# Patient Record
Sex: MALE | Race: BLACK OR AFRICAN AMERICAN | Employment: OTHER | URBAN - METROPOLITAN AREA
[De-identification: names, ages, dates, MRNs, and addresses within clinical notes are randomized per-mention and may not be internally consistent; named-entity substitution may affect disease eponyms.]

---

## 2018-01-07 ENCOUNTER — HOSPITAL ENCOUNTER (INPATIENT)
Age: 73
LOS: 7 days | Discharge: HOME | DRG: 164 | End: 2018-01-14
Attending: INTERNAL MEDICINE | Admitting: INTERNAL MEDICINE
Payer: MEDICARE

## 2018-01-07 DIAGNOSIS — J90 RECURRENT LEFT PLEURAL EFFUSION: Primary | ICD-10-CM

## 2018-01-07 PROCEDURE — 65610000006 HC RM INTENSIVE CARE

## 2018-01-07 NOTE — IP AVS SNAPSHOT
1111 [facility] [address] [place] [address] 
[phone] Patient: Cheyenne Han MRN: [id number] TOY:9/25/[birth year]52 You are allergic to the following No active allergies Recent Documentation Height Weight BMI  
  
  
 1.73 m 68.2 kg 22.79 kg/m2 Unresulted Labs-Please follow up with your PCP about these lab tests Order Current Status SUSCEPTIBILITY, AER + ANAEROB Preliminary result Emergency Contacts  (Rel.) Home Phone Work Phone Mobile Phone Charlee Obando (Spouse) [phone] -- -- About your hospitalization You were admitted on:  January 7, 2018 You last received care in the:  [facility] You were discharged on:  January 14, 2018 Why you were hospitalized Your primary diagnosis was:  Recurrent Left Pleural Effusion Providers Seen During Your Hospitalization Provider Specialty Primary office phone Jayne Mir MD Internal Medicine [phone] Ken Silverman MD Internal Medicine [phone] Raphael Vogt MD Internal Medicine [phone] Your Primary Care Physician (PCP) Primary Care Physician Office Phone Office Fax NONE ** None ** ** None ** Follow-up Information Follow up With Details Comments Contact Info PCP In 4 days Samia Chatterjee MD In 2 weeks  [address] [place] [address], [address] [place] [address] [address] [place] [address] 
[phone] Gastroenterologist In 2 weeks Liliana Cooney MD In 1 week  [address] [place] [address] [address] [facility] [address] 
[phone] None   None (395) Patient stated that they have no PCP My Medications STOP taking these medications   
 aspirin delayed-release 81 mg tablet COUMADIN 5 mg tablet Generic drug:  warfarin  
   
  
 naproxen 500 mg tablet Commonly known as:  NAPROSYN  
   
  
  
TAKE these medications as instructed Instructions Each Dose to Equal  
 Morning Noon Evening Bedtime  
 amoxicillin-clavulanate 875-125 mg per tablet Commonly known as:  AUGMENTIN Your last dose was: Your next dose is: Take 1 Tab by mouth every twelve (12) hours. 1 Tab  
    
   
   
   
  
 carvedilol 25 mg tablet Commonly known as:  Daryle Rubins Your last dose was: Your next dose is: Take 25 mg by mouth two (2) times daily (with meals). 25 mg  
    
   
   
   
  
 digoxin 0.25 mg tablet Commonly known as:  Theramirez Pates Your last dose was: Your next dose is: Take 0.5 Tabs by mouth daily. 0.125 mg  
    
   
   
   
  
 L. acidoph & paracasei- S therm- Bifido 8 billion cell Cap cap Commonly known as:  SALLIE-Q/RISAQUAD Start taking on:  1/15/2018 Your last dose was: Your next dose is: Take 1 Cap by mouth daily. 1 Cap  
    
   
   
   
  
 oxyCODONE-acetaminophen 5-325 mg per tablet Commonly known as:  PERCOCET Your last dose was: Your next dose is: Take 2 Tabs by mouth every four (4) hours as needed. Max Daily Amount: 12 Tabs. 2 Tab  
    
   
   
   
  
 pantoprazole 40 mg tablet Commonly known as:  PROTONIX Your last dose was: Your next dose is: Take 1 Tab by mouth Before breakfast and dinner. 40 mg Where to Get Your Medications Information on where to get these meds will be given to you by the nurse or doctor. ! Ask your nurse or doctor about these medications  
  amoxicillin-clavulanate 875-125 mg per tablet  
 digoxin 0.25 mg tablet L. acidoph & paracasei- S therm- Bifido 8 billion cell Cap cap  
 oxyCODONE-acetaminophen 5-325 mg per tablet  
 pantoprazole 40 mg tablet Discharge Instructions Discharge Instructions PATIENT ID: Lorene Husain MRN: [id number] YOB: 1945 DATE OF ADMISSION: 1/7/2018 10:19 PM   
DATE OF DISCHARGE: 1/14/2018 PRIMARY CARE PROVIDER: None ATTENDING PHYSICIAN: Kristyn Taylor MD 
DISCHARGING PROVIDER: Kristyn Taylor MD   
To contact this individual call [phone] and ask the  to page. If unavailable ask to be transferred the Adult Hospitalist Department. DISCHARGE DIAGNOSES Pleural effusion CONSULTATIONS: IP CONSULT TO THORACIC SURGERY 
IP CONSULT TO GASTROENTEROLOGY PROCEDURES/SURGERIES: Procedure(s): LEFT VIDEO ASSISTED THOROCOSCOPY, MINI AXILLARY THOROCOTOMY, FULL PULMONARY DECORTICATION PENDING TEST RESULTS:  
At the time of discharge the following test results are still pending: none FOLLOW UP APPOINTMENTS:  
Follow-up Information Follow up With Details Comments Contact Info PCP In 4 days Veena Lucero MD In 2 weeks  [address] [place] [address] 
[phone] Gastroenterologist In 2 weeks Gabriel English MD In 1 week  200 [facility] [address] [place] [address] 
[phone] ADDITIONAL CARE RECOMMENDATIONS:  
Follow up with your PMD, thoracic surgery, Gastroenterologist as well as your cardiologist 
Your heart rate is still high, if goes higher than 130 please call your cardiologist 
Your gastroenterologist will let your know about restarting your ASA as well as coumadin Repeat CXR in 4 weeks DIET: Cardiac Diet ACTIVITY: avoid heavy lifting for 4 weeks and avoid driving until you return to thoracic surgery clinic. WOUND CARE: Leave CT bandage in place for 48 hours then safe to resume showering. DISCHARGE MEDICATIONS: 
 See Medication Reconciliation Form · It is important that you take the medication exactly as they are prescribed. · Keep your medication in the bottles provided by the pharmacist and keep a list of the medication names, dosages, and times to be taken in your wallet. · Do not take other medications without consulting your doctor. NOTIFY YOUR PHYSICIAN FOR ANY OF THE FOLLOWING:  
Fever over 101 degrees for 24 hours. Chest pain, shortness of breath, fever, chills, nausea, vomiting, diarrhea, change in mentation, falling, weakness, bleeding. Severe pain or pain not relieved by medications. Or, any other signs or symptoms that you may have questions about. DISPOSITION: 
x  Home With: 
 OT  PT  [facility]  RN  
  
 SNF/Inpatient Rehab/LTAC Independent/assisted living Hospice Other: CDMP Checked:  
Yes x PROBLEM LIST Updated: 
Yes x Signed:  
Brandon Newman MD 
1/14/2018 
1:57 PM 
 
Discharge Instructions Attachments/References HEART FAILURE: AVOIDING TRIGGERS (ENGLISH) Discharge Orders None [employer] Announcement We are excited to announce that we are making your provider's discharge notes available to you in [employer]. You will see these notes when they are completed and signed by the physician that discharged you from your recent hospital stay. If you have any questions or concerns about any information you see in [employer], please call the Health Information Department where you were seen or reach out to your Primary Care Provider for more information about your plan of care. Introducing [facility]! Mitch Romeo introduces [employer] patient portal. Now you can access parts of your medical record, email your doctor's office, and request medication refills online. 1. In your internet browser, go to [email or web address] 2. Click on the First Time User? Click Here link in the Sign In box. You will see the New Member Sign Up page. 3. Enter your [employer] Access Code exactly as it appears below. You will not need to use this code after youve completed the sign-up process. If you do not sign up before the expiration date, you must request a new code.  
 
· [facility] Access Code: [email or web address] 
 Expires: 4/7/2018 10:17 PM 
 
4. Enter the last four digits of your Social Security Number (xxxx) and Date of Birth (mm/dd/yyyy) as indicated and click Submit. You will be taken to the next sign-up page. 5. Create a [employer] ID. This will be your [employer] login ID and cannot be changed, so think of one that is secure and easy to remember. 6. Create a [employer] password. You can change your password at any time. 7. Enter your Password Reset Question and Answer. This can be used at a later time if you forget your password. 8. Enter your e-mail address. You will receive e-mail notification when new information is available in [address] 9. Click Sign Up. You can now view and download portions of your medical record. 10. Click the Download Summary menu link to download a portable copy of your medical information. If you have questions, please visit the Frequently Asked Questions section of the [employer] website. Remember, [employer] is NOT to be used for urgent needs. For medical emergencies, dial 911. Now available from your iPhone and Android! General Information Please provide this summary of care documentation to your next provider. Patient Signature:  ____________________________________________________________ Date:  ____________________________________________________________  
  
Marlyse Leaks Provider Signature:  ____________________________________________________________ Date:  ____________________________________________________________

## 2018-01-08 ENCOUNTER — APPOINTMENT (OUTPATIENT)
Dept: GENERAL RADIOLOGY | Age: 73
DRG: 164 | End: 2018-01-08
Attending: INTERNAL MEDICINE
Payer: MEDICARE

## 2018-01-08 ENCOUNTER — APPOINTMENT (OUTPATIENT)
Dept: GENERAL RADIOLOGY | Age: 73
DRG: 164 | End: 2018-01-08
Attending: NURSE PRACTITIONER
Payer: MEDICARE

## 2018-01-08 PROBLEM — J90 RECURRENT LEFT PLEURAL EFFUSION: Status: ACTIVE | Noted: 2018-01-08

## 2018-01-08 LAB
ALBUMIN SERPL-MCNC: 1.8 G/DL (ref 3.5–5)
ALBUMIN/GLOB SERPL: 0.3 {RATIO} (ref 1.1–2.2)
ALP SERPL-CCNC: 265 U/L (ref 45–117)
ALT SERPL-CCNC: 16 U/L (ref 12–78)
ANION GAP SERPL CALC-SCNC: 7 MMOL/L (ref 5–15)
AST SERPL-CCNC: 11 U/L (ref 15–37)
BASOPHILS # BLD: 0 K/UL (ref 0–0.1)
BASOPHILS NFR BLD: 0 % (ref 0–1)
BILIRUB SERPL-MCNC: 0.8 MG/DL (ref 0.2–1)
BODY FLD TYPE: NORMAL
BUN SERPL-MCNC: 15 MG/DL (ref 6–20)
BUN/CREAT SERPL: 15 (ref 12–20)
CALCIUM SERPL-MCNC: 8.8 MG/DL (ref 8.5–10.1)
CHLORIDE SERPL-SCNC: 108 MMOL/L (ref 97–108)
CK MB CFR SERPL CALC: ABNORMAL % (ref 0–2.5)
CK MB SERPL-MCNC: <1 NG/ML (ref 5–25)
CK SERPL-CCNC: 16 U/L (ref 39–308)
CO2 SERPL-SCNC: 23 MMOL/L (ref 21–32)
CREAT SERPL-MCNC: 1 MG/DL (ref 0.7–1.3)
DIFFERENTIAL METHOD BLD: ABNORMAL
EOSINOPHIL # BLD: 0.1 K/UL (ref 0–0.4)
EOSINOPHIL NFR BLD: 1 % (ref 0–7)
ERYTHROCYTE [DISTWIDTH] IN BLOOD BY AUTOMATED COUNT: 19.1 % (ref 11.5–14.5)
FERRITIN SERPL-MCNC: 767 NG/ML (ref 26–388)
FOLATE SERPL-MCNC: 26.6 NG/ML (ref 5–21)
GLOBULIN SER CALC-MCNC: 5.5 G/DL (ref 2–4)
GLUCOSE FLD-MCNC: <1 MG/DL
GLUCOSE SERPL-MCNC: 91 MG/DL (ref 65–100)
HCT VFR BLD AUTO: 22 % (ref 36.6–50.3)
HGB BLD-MCNC: 6.7 G/DL (ref 12.1–17)
INR PPP: 1.5 (ref 0.9–1.1)
IRON SATN MFR SERPL: 14 % (ref 20–50)
IRON SERPL-MCNC: 18 UG/DL (ref 35–150)
IRON SERPL-MCNC: 19 UG/DL (ref 35–150)
LACTATE SERPL-SCNC: 1.4 MMOL/L (ref 0.4–2)
LDH FLD L TO P-CCNC: >1200 U/L
LYMPHOCYTES # BLD: 1.7 K/UL (ref 0.8–3.5)
LYMPHOCYTES NFR BLD: 18 % (ref 12–49)
MAGNESIUM SERPL-MCNC: 1.9 MG/DL (ref 1.6–2.4)
MCH RBC QN AUTO: 25.7 PG (ref 26–34)
MCHC RBC AUTO-ENTMCNC: 30.5 G/DL (ref 30–36.5)
MCV RBC AUTO: 84.3 FL (ref 80–99)
MONOCYTES # BLD: 1.1 K/UL (ref 0–1)
MONOCYTES NFR BLD: 11 % (ref 5–13)
NEUTS SEG # BLD: 6.8 K/UL (ref 1.8–8)
NEUTS SEG NFR BLD: 70 % (ref 32–75)
PH FLD: 6.4 [PH]
PHOSPHATE SERPL-MCNC: 3.1 MG/DL (ref 2.6–4.7)
PLATELET # BLD AUTO: 496 K/UL (ref 150–400)
PLATELET COMMENTS,PCOM: ABNORMAL
POTASSIUM SERPL-SCNC: 4.2 MMOL/L (ref 3.5–5.1)
PROT SERPL-MCNC: 7.3 G/DL (ref 6.4–8.2)
PROTHROMBIN TIME: 14.9 SEC (ref 9–11.1)
RBC # BLD AUTO: 2.61 M/UL (ref 4.1–5.7)
RBC MORPH BLD: ABNORMAL
SODIUM SERPL-SCNC: 138 MMOL/L (ref 136–145)
SPECIMEN SOURCE FLD: NORMAL
SPECIMEN SOURCE FLD: NORMAL
TIBC SERPL-MCNC: 137 UG/DL (ref 250–450)
TROPONIN I SERPL-MCNC: <0.04 NG/ML
TSH SERPL DL<=0.05 MIU/L-ACNC: 0.69 UIU/ML (ref 0.36–3.74)
VIT B12 SERPL-MCNC: 805 PG/ML (ref 211–911)
WBC # BLD AUTO: 9.7 K/UL (ref 4.1–11.1)

## 2018-01-08 PROCEDURE — P9016 RBC LEUKOCYTES REDUCED: HCPCS | Performed by: INTERNAL MEDICINE

## 2018-01-08 PROCEDURE — 82607 VITAMIN B-12: CPT | Performed by: INTERNAL MEDICINE

## 2018-01-08 PROCEDURE — 87070 CULTURE OTHR SPECIMN AEROBIC: CPT | Performed by: THORACIC SURGERY (CARDIOTHORACIC VASCULAR SURGERY)

## 2018-01-08 PROCEDURE — 74011250636 HC RX REV CODE- 250/636: Performed by: INTERNAL MEDICINE

## 2018-01-08 PROCEDURE — 89050 BODY FLUID CELL COUNT: CPT | Performed by: THORACIC SURGERY (CARDIOTHORACIC VASCULAR SURGERY)

## 2018-01-08 PROCEDURE — 84484 ASSAY OF TROPONIN QUANT: CPT | Performed by: INTERNAL MEDICINE

## 2018-01-08 PROCEDURE — 88112 CYTOPATH CELL ENHANCE TECH: CPT | Performed by: INTERNAL MEDICINE

## 2018-01-08 PROCEDURE — 87186 SC STD MICRODIL/AGAR DIL: CPT | Performed by: HOSPITALIST

## 2018-01-08 PROCEDURE — 36415 COLL VENOUS BLD VENIPUNCTURE: CPT | Performed by: INTERNAL MEDICINE

## 2018-01-08 PROCEDURE — 82728 ASSAY OF FERRITIN: CPT | Performed by: INTERNAL MEDICINE

## 2018-01-08 PROCEDURE — 74011636637 HC RX REV CODE- 636/637: Performed by: INTERNAL MEDICINE

## 2018-01-08 PROCEDURE — 80053 COMPREHEN METABOLIC PANEL: CPT | Performed by: INTERNAL MEDICINE

## 2018-01-08 PROCEDURE — 74011000250 HC RX REV CODE- 250: Performed by: INTERNAL MEDICINE

## 2018-01-08 PROCEDURE — 74011250637 HC RX REV CODE- 250/637: Performed by: NURSE PRACTITIONER

## 2018-01-08 PROCEDURE — 77030032490 HC SLV COMPR SCD KNE COVD -B

## 2018-01-08 PROCEDURE — 94640 AIRWAY INHALATION TREATMENT: CPT

## 2018-01-08 PROCEDURE — 74011000258 HC RX REV CODE- 258: Performed by: INTERNAL MEDICINE

## 2018-01-08 PROCEDURE — 93306 TTE W/DOPPLER COMPLETE: CPT | Performed by: PHYSICIAN ASSISTANT

## 2018-01-08 PROCEDURE — 83615 LACTATE (LD) (LDH) ENZYME: CPT | Performed by: THORACIC SURGERY (CARDIOTHORACIC VASCULAR SURGERY)

## 2018-01-08 PROCEDURE — 83735 ASSAY OF MAGNESIUM: CPT | Performed by: INTERNAL MEDICINE

## 2018-01-08 PROCEDURE — 0W9B30Z DRAINAGE OF LEFT PLEURAL CAVITY WITH DRAINAGE DEVICE, PERCUTANEOUS APPROACH: ICD-10-PCS | Performed by: THORACIC SURGERY (CARDIOTHORACIC VASCULAR SURGERY)

## 2018-01-08 PROCEDURE — 74011250637 HC RX REV CODE- 250/637: Performed by: INTERNAL MEDICINE

## 2018-01-08 PROCEDURE — 86901 BLOOD TYPING SEROLOGIC RH(D): CPT | Performed by: INTERNAL MEDICINE

## 2018-01-08 PROCEDURE — 65610000006 HC RM INTENSIVE CARE

## 2018-01-08 PROCEDURE — C9113 INJ PANTOPRAZOLE SODIUM, VIA: HCPCS | Performed by: INTERNAL MEDICINE

## 2018-01-08 PROCEDURE — 85610 PROTHROMBIN TIME: CPT | Performed by: INTERNAL MEDICINE

## 2018-01-08 PROCEDURE — 83605 ASSAY OF LACTIC ACID: CPT | Performed by: INTERNAL MEDICINE

## 2018-01-08 PROCEDURE — 82550 ASSAY OF CK (CPK): CPT | Performed by: INTERNAL MEDICINE

## 2018-01-08 PROCEDURE — 77030029684 HC NEB SM VOL KT MONA -A

## 2018-01-08 PROCEDURE — 84443 ASSAY THYROID STIM HORMONE: CPT | Performed by: INTERNAL MEDICINE

## 2018-01-08 PROCEDURE — 83540 ASSAY OF IRON: CPT | Performed by: INTERNAL MEDICINE

## 2018-01-08 PROCEDURE — 88305 TISSUE EXAM BY PATHOLOGIST: CPT | Performed by: INTERNAL MEDICINE

## 2018-01-08 PROCEDURE — 84100 ASSAY OF PHOSPHORUS: CPT | Performed by: INTERNAL MEDICINE

## 2018-01-08 PROCEDURE — 87077 CULTURE AEROBIC IDENTIFY: CPT | Performed by: HOSPITALIST

## 2018-01-08 PROCEDURE — 83986 ASSAY PH BODY FLUID NOS: CPT | Performed by: THORACIC SURGERY (CARDIOTHORACIC VASCULAR SURGERY)

## 2018-01-08 PROCEDURE — 82746 ASSAY OF FOLIC ACID SERUM: CPT | Performed by: INTERNAL MEDICINE

## 2018-01-08 PROCEDURE — 36430 TRANSFUSION BLD/BLD COMPNT: CPT

## 2018-01-08 PROCEDURE — 74011250636 HC RX REV CODE- 250/636: Performed by: THORACIC SURGERY (CARDIOTHORACIC VASCULAR SURGERY)

## 2018-01-08 PROCEDURE — 30233N1 TRANSFUSION OF NONAUTOLOGOUS RED BLOOD CELLS INTO PERIPHERAL VEIN, PERCUTANEOUS APPROACH: ICD-10-PCS | Performed by: INTERNAL MEDICINE

## 2018-01-08 PROCEDURE — 82945 GLUCOSE OTHER FLUID: CPT | Performed by: THORACIC SURGERY (CARDIOTHORACIC VASCULAR SURGERY)

## 2018-01-08 PROCEDURE — 71045 X-RAY EXAM CHEST 1 VIEW: CPT

## 2018-01-08 PROCEDURE — 85025 COMPLETE CBC W/AUTO DIFF WBC: CPT | Performed by: INTERNAL MEDICINE

## 2018-01-08 PROCEDURE — 93306 TTE W/DOPPLER COMPLETE: CPT

## 2018-01-08 PROCEDURE — 86923 COMPATIBILITY TEST ELECTRIC: CPT | Performed by: INTERNAL MEDICINE

## 2018-01-08 RX ORDER — HYDROCODONE BITARTRATE AND ACETAMINOPHEN 5; 325 MG/1; MG/1
1 TABLET ORAL
Status: DISCONTINUED | OUTPATIENT
Start: 2018-01-08 | End: 2018-01-08

## 2018-01-08 RX ORDER — SODIUM CHLORIDE 0.9 % (FLUSH) 0.9 %
SYRINGE (ML) INJECTION
Status: COMPLETED
Start: 2018-01-08 | End: 2018-01-08

## 2018-01-08 RX ORDER — PANTOPRAZOLE SODIUM 40 MG/1
40 TABLET, DELAYED RELEASE ORAL
Status: DISCONTINUED | OUTPATIENT
Start: 2018-01-08 | End: 2018-01-14 | Stop reason: HOSPADM

## 2018-01-08 RX ORDER — LIDOCAINE HYDROCHLORIDE 10 MG/ML
20 INJECTION INFILTRATION; PERINEURAL
Status: DISCONTINUED | OUTPATIENT
Start: 2018-01-08 | End: 2018-01-09

## 2018-01-08 RX ORDER — MORPHINE SULFATE 2 MG/ML
2 INJECTION, SOLUTION INTRAMUSCULAR; INTRAVENOUS
Status: DISCONTINUED | OUTPATIENT
Start: 2018-01-08 | End: 2018-01-08

## 2018-01-08 RX ORDER — PREDNISONE 20 MG/1
40 TABLET ORAL
Status: DISCONTINUED | OUTPATIENT
Start: 2018-01-08 | End: 2018-01-08

## 2018-01-08 RX ORDER — ACETAMINOPHEN 325 MG/1
650 TABLET ORAL
Status: DISCONTINUED | OUTPATIENT
Start: 2018-01-08 | End: 2018-01-09

## 2018-01-08 RX ORDER — SODIUM CHLORIDE 9 MG/ML
250 INJECTION, SOLUTION INTRAVENOUS AS NEEDED
Status: DISCONTINUED | OUTPATIENT
Start: 2018-01-08 | End: 2018-01-09

## 2018-01-08 RX ORDER — LEVOFLOXACIN 5 MG/ML
750 INJECTION, SOLUTION INTRAVENOUS
Status: COMPLETED | OUTPATIENT
Start: 2018-01-08 | End: 2018-01-10

## 2018-01-08 RX ORDER — ONDANSETRON 2 MG/ML
4 INJECTION INTRAMUSCULAR; INTRAVENOUS
Status: DISCONTINUED | OUTPATIENT
Start: 2018-01-08 | End: 2018-01-14 | Stop reason: HOSPADM

## 2018-01-08 RX ORDER — IPRATROPIUM BROMIDE AND ALBUTEROL SULFATE 2.5; .5 MG/3ML; MG/3ML
3 SOLUTION RESPIRATORY (INHALATION)
Status: DISCONTINUED | OUTPATIENT
Start: 2018-01-08 | End: 2018-01-14 | Stop reason: HOSPADM

## 2018-01-08 RX ORDER — HYDROMORPHONE HYDROCHLORIDE 1 MG/ML
1 INJECTION, SOLUTION INTRAMUSCULAR; INTRAVENOUS; SUBCUTANEOUS
Status: COMPLETED | OUTPATIENT
Start: 2018-01-08 | End: 2018-01-08

## 2018-01-08 RX ORDER — VANCOMYCIN/0.9 % SOD CHLORIDE 1.5G/250ML
1500 PLASTIC BAG, INJECTION (ML) INTRAVENOUS ONCE
Status: COMPLETED | OUTPATIENT
Start: 2018-01-08 | End: 2018-01-10

## 2018-01-08 RX ORDER — DIGOXIN 125 MCG
125 TABLET ORAL DAILY
Status: DISCONTINUED | OUTPATIENT
Start: 2018-01-08 | End: 2018-01-11

## 2018-01-08 RX ORDER — CARVEDILOL 12.5 MG/1
25 TABLET ORAL 2 TIMES DAILY WITH MEALS
Status: DISCONTINUED | OUTPATIENT
Start: 2018-01-08 | End: 2018-01-14 | Stop reason: HOSPADM

## 2018-01-08 RX ORDER — FUROSEMIDE 10 MG/ML
40 INJECTION INTRAMUSCULAR; INTRAVENOUS DAILY
Status: DISCONTINUED | OUTPATIENT
Start: 2018-01-08 | End: 2018-01-08

## 2018-01-08 RX ORDER — DOCUSATE SODIUM 100 MG/1
100 CAPSULE, LIQUID FILLED ORAL 2 TIMES DAILY
Status: DISCONTINUED | OUTPATIENT
Start: 2018-01-08 | End: 2018-01-14 | Stop reason: HOSPADM

## 2018-01-08 RX ORDER — LEVOFLOXACIN 5 MG/ML
750 INJECTION, SOLUTION INTRAVENOUS EVERY 24 HOURS
Status: DISCONTINUED | OUTPATIENT
Start: 2018-01-08 | End: 2018-01-09

## 2018-01-08 RX ADMIN — IPRATROPIUM BROMIDE AND ALBUTEROL SULFATE 3 ML: .5; 3 SOLUTION RESPIRATORY (INHALATION) at 03:58

## 2018-01-08 RX ADMIN — SODIUM CHLORIDE 40 MG: 9 INJECTION INTRAMUSCULAR; INTRAVENOUS; SUBCUTANEOUS at 07:07

## 2018-01-08 RX ADMIN — PIPERACILLIN SODIUM AND TAZOBACTAM SODIUM 3.38 G: .375; 3 INJECTION, POWDER, LYOPHILIZED, FOR SOLUTION INTRAVENOUS at 17:10

## 2018-01-08 RX ADMIN — PREDNISONE 40 MG: 20 TABLET ORAL at 09:06

## 2018-01-08 RX ADMIN — Medication: at 16:00

## 2018-01-08 RX ADMIN — DOCUSATE SODIUM 100 MG: 100 CAPSULE, LIQUID FILLED ORAL at 09:06

## 2018-01-08 RX ADMIN — Medication 1 CAPSULE: at 09:06

## 2018-01-08 RX ADMIN — PANTOPRAZOLE SODIUM 40 MG: 40 TABLET, DELAYED RELEASE ORAL at 17:04

## 2018-01-08 RX ADMIN — CARVEDILOL 25 MG: 12.5 TABLET, FILM COATED ORAL at 17:04

## 2018-01-08 RX ADMIN — HYDROMORPHONE HYDROCHLORIDE 1 MG: 1 INJECTION, SOLUTION INTRAMUSCULAR; INTRAVENOUS; SUBCUTANEOUS at 15:22

## 2018-01-08 RX ADMIN — DILTIAZEM HYDROCHLORIDE 2.5 MG/HR: 5 INJECTION, SOLUTION INTRAVENOUS at 01:25

## 2018-01-08 RX ADMIN — VANCOMYCIN HYDROCHLORIDE 1500 MG: 10 INJECTION, POWDER, LYOPHILIZED, FOR SOLUTION INTRAVENOUS at 17:12

## 2018-01-08 RX ADMIN — DIGOXIN 0.12 MG: 125 TABLET ORAL at 09:06

## 2018-01-08 RX ADMIN — LEVOFLOXACIN 750 MG: 5 INJECTION, SOLUTION INTRAVENOUS at 01:32

## 2018-01-08 RX ADMIN — DOCUSATE SODIUM 100 MG: 100 CAPSULE, LIQUID FILLED ORAL at 17:48

## 2018-01-08 RX ADMIN — CARVEDILOL 25 MG: 12.5 TABLET, FILM COATED ORAL at 09:06

## 2018-01-08 RX ADMIN — PIPERACILLIN AND TAZOBACTAM 10.12 G: 3; .375 INJECTION, POWDER, FOR SOLUTION INTRAVENOUS at 17:49

## 2018-01-08 RX ADMIN — FUROSEMIDE 40 MG: 10 INJECTION, SOLUTION INTRAMUSCULAR; INTRAVENOUS at 10:01

## 2018-01-08 NOTE — PROGRESS NOTES
Chest tube performed. He did not have a pleural effusion as expected. We drained thick, purulent material from the chest.  New clinical diagnosis is empyema. I discussed this with the patient and the intensivist.  Given his Chest CT appearance and the purulent material Mr. Zeinab Diaz would benefit from a VATS decortication. I have posted him for tomorrow. He is at moderate risk due to the urgent nature, his unexplained anemia and his cardiac status. Also, I would have liked to know more about the ERUM mass. Intraoperatively we will biopsy the mass if feasible. We will NOT perform any definitive resections.

## 2018-01-08 NOTE — PROGRESS NOTES
Pharmacist Note - Vancomycin Dosing    Consult provided for this 67 y.o. male for indication of recurrent pleural effusion, empyema, possible PNA (?)  Antibiotic regimen(s): Cefepime + Levaquin    Recent Labs      18   0334   WBC  9.7   CREA  1.00   BUN  15     Frequency of BMP: x 1 tomorrow  Height: 173 cm  Weight: 64.8 kg  Est CrCl: ~60-65 ml/min  Temp (24hrs), Av.3 °F (36.8 °C), Min:97.8 °F (36.6 °C), Max:99.3 °F (37.4 °C)    Cultures: None    Goal trough = 15 - 20 mcg/mL    Therapy will be initiated with a loading dose of 1500 mg IV x 1 to be followed by a maintenance dose of 1000 mg IV every 16 hours. Pharmacy to follow patient daily and order levels / make dose adjustments as appropriate.

## 2018-01-08 NOTE — H&P
[address]  ACUTE CARE HISTORY AND PHYSICAL    Lorna Alansi  MR#: [id number]  : 1945  ACCOUNT #: [de-identified]   DATE OF SERVICE: 2018    PRIMARY CARE PHYSICIAN:  Unknown. SOURCE OF INFORMATION:  The patient. CHIEF COMPLAINT:  Shortness of breath. HISTORY OF PRESENT ILLNESS:  This is a 72-year-old man with a past medical history significant for COPD, atrial fibrillation, hypertension, congestive heart failure, pulmonary hypertension, and lung mass. He was in his usual state of health until about a week ago when patient developed shortness of breath. The shortness of breath is progressive and getting worse. The shortness of breath is with very minimal exertion. The patient stated that he cannot take a few steps without becoming short of breath. Patient has a history of a lung mass. About a year ago, the patient underwent evaluation for the lung mass including a biopsy that was not conclusive. He is scheduled for a repeat biopsy of the lung mass this coming February. The patient has also had a recurrent pleural effusion that has been treated in the past.  Because of the worsening shortness of breath, the patient went to [facility] for evaluation of his shortness of breath. The shortness of breath is not associated with fever, rigors or chills. When the patient arrived at the emergency room at this hospital, a chest x-ray was obtained. The chest x-ray shows a large, right, pleural effusion as well as a suspected pneumonia. Since the patient has had recurrent multiple episodes of pleural effusions, the emergency room physician discussed the patient with his pulmonologist who stated that the patient may benefit from a VATS procedure.   This procedure cannot be done at [facility].  The patient was then transferred to the hospitalist service at [facility] so that the thoracic surgeon can evaluate the patient for a VATS procedure. Also, the patient's pulmonologist will be able to see the patient to determine the next plan for the persistent, left, lung mass. The patient was also found to be in atrial fibrillation when the patient arrived at the emergency room. He received a Cardizem bolus for rate control. The patient was on Coumadin for anticoagulation for the atrial fibrillation. When he recently had a cataract extraction, Coumadin was held during the procedure. Since the patient is most likely going to have a lung biopsy done in February, he was told to stay off the Coumadin until after his repeat lung biopsy in February of this year. PAST MEDICAL HISTORY:  Lung mass, recurrent pleural effusion, atrial fibrillation, COPD, hypertension, chronic congestive heart failure, pulmonary hypertension, cardiomyopathy. ALLERGIES:  NO KNOWN DRUG ALLERGIES. MEDICATIONS:  Aspirin 81 mg daily, Naprosyn 500 mg twice daily, Coreg 25 mg twice daily, and Digoxin 250 mcg daily. FAMILY HISTORY:  This was reviewed; it is not pertinent to present illness. No family history of lung cancer or lung disease. PAST SURGICAL HISTORY:  This is significant for cataract extraction, bilateral inguinal hernia repair. SOCIAL HISTORY:  Patient is a former smoker. Patient quit alcohol abuse in 2014. REVIEW OF SYSTEMS:  HEENT:  No headache, no dizziness, no blurring of vision, no photophobia. RESPIRATORY:  This is positive for shortness of breath and cough. No hemoptysis. CARDIOVASCULAR:  Positive for orthopnea. No chest pain, no palpitations. GASTROINTESTINAL:  No nausea and vomiting, no diarrhea, no constipation. GENITOURINARY:  No dysuria, no urgency, no frequency. All other systems are reviewed and negative. PHYSICAL EXAMINATION:  GENERAL:  Patient appeared ill, in moderate distress.   VITAL SIGNS:  Temperature 98.7, pulse 141, blood pressure 128/84, respiratory rate 32, oxygen saturation 97% on room air. HEAD:  Normocephalic, atraumatic. EYES:  Normal eye movement. No redness, no drainage, no discharge. EARS:  Normal external ears with no obvious drainage. NOSE:  No deformity. No drainage. MOUTH AND THROAT:  No visible oral lesions. NECK:  Supple, mild JVD, no thyromegaly. CHEST:  Diffuse expiratory wheezing and bilateral basilar crackles. HEART:  Normal S1 and S2, irregularly irregular. No clinically appreciable murmur. ABDOMEN:  Soft, nontender, normal bowel sounds. CENTRAL NERVOUS SYSTEM:  Alert, oriented x3. No gross focal neurological deficit. EXTREMITIES:  No edema. Pulses 2+ bilaterally. MUSCULOSKELETAL:  No obvious joint deformity or swelling. SKIN:  No active skin lesions seen in the exposed part of the body. PSYCHIATRIC:  Normal mood and affect. LYMPHATIC SYSTEM:  No cervical lymphadenopathy. DIAGNOSTIC DATA:    ELECTROCARDIOGRAM:  The EKG shows atrial fibrillation with a rapid ventricular response and nonspecific ST-T wave abnormalities. CHEST X-RAY:  Chest x-ray shows a large, left, pleural effusion and a left lung mass, suspected pneumonia. CHEST CTA SCAN:  CTA of the chest done at the emergency room is not available for review at this time. LABORATORY DATA:  BNP level 430. Hematology:  WBC 11.4, hemoglobin 8.0, hematocrit 26.1, platelets 340. Lactic acid level 1.48. ABG done in the emergency room shows pH 7.465, pCO2 of 29.3, pO2 of 89, and oxygen saturation 97%. Chemistry:  Sodium 137, potassium 4.2, chloride 104, CO2 of 24, BUN 15, creatinine 1.0, glucose 96. Calcium 8.8. Total protein 7.7, albumin level 2.4, globulin 5.3, alkaline phosphatase 235, AST 21, ALT 18, total bilirubin 0.8, magnesium level 2.2. ASSESSMENT:  1. Recurrent left pleural effusion. 2.  Left lung mass. 3.  Persistent atrial fibrillation with rapid ventricular response. 4.  Anemia.   5.  Chronic obstructive pulmonary disease with acute exacerbation. 6.  Suspected bacterial pneumonia. 7.  Hypertension. 8.  Acute on chronic congestive heart failure. ASSESSMENT AND PLAN:  1. Recurrent left pleural effusion: We will admit the patient for further evaluation and treatment. We will await further recommendations from the thoracic surgeon. We will monitor the patient closely. 2.  Left lung mass:  Patient is awaiting a repeat lung biopsy. Will await further recommendations from the patient pulmonologist.  3.  Persistent atrial fibrillation with rapid ventricular response: Will start the patient on Cardizem, continuous infusion, for rate control, if tolerated by the patient's blood pressure. 4.  Anemia: This is secondary to chronic disease. Will carry out anemia workup including checking stool guaiac to rule out occult gastrointestinal bleed. 5.  Chronic obstructive pulmonary disease with acute exacerbation: We will start the patient on a short course of prednisone. Patient will also be placed on DuoNeb. We will await further recommendations from the pulmonologist.  6.  Suspected bacterial pneumonia: We will place the patient on Levaquin. 7.  Hypertension: We will resume preadmission medication. Monitor the patient's blood pressure closely. 8.  Acute on chronic congestive heart failure: We will start the patient on Lasix. We will obtain an echocardiogram to determine the ejection fraction and also the type of congestive heart failure. Cardiology consult will be requested to assist in further evaluation and treatment of congestive heart failure as well as evaluation and treatment of persistent atrial fibrillation with rapid ventricular response. OTHER ISSUES:   CODE STATUS:  The patient is a FULL CODE. PROPHYLAXIS:  We will request for SCD's for DVT prophylaxis.       MD CISCO Bower / EDMUNDO  D: 01/08/2018 00:18     T: 01/08/2018 03:52  JOB #: [id number]

## 2018-01-08 NOTE — PROCEDURES
Surgeon: Ravindra Rosario    Procedure: left Tube thoracostomy    Pre-op diagnosis: left recurrent pleural effusion    Post-op diagnosis: left empyema    Drains and tube: 28Fr chest tube    Specimens sent:  Pleural fluid was sent for cytology, Micro and chemistry analysis    EBL: 5ml    Anesth: 30ml 1% lidocaine, 1mg IV dilaudid    Procedure: Informed consent obtained. Timeout performed. Radiographs reveiwed. Left chest prepped and draped dk  Sterile fashion. 30ml 1% lido used as local anesthetic.  2cm incision made over 6th intercostal space, anterior axillary line. Access gained to thoracic cavity and 28Fr chest tube placed posteriorly and superiorly. Greater than 500ml Jesus Alberto purulence [thick, brown, malodorous] drained from the chest.  Specimens sent. Chest tube secured with 0-silk suture.     No immediate complications identified    CXR pending

## 2018-01-08 NOTE — PROGRESS NOTES
Intensivist    Transferred from [facility] for eval of pleural effusion and thoracic surgery eval  Has been seen in past by Dr Sonia Steiner for effusion and lung mass that reportedly was decreasing in size    Underwent thoracentesis on 12/27 - results not known at this time  Has a h/o afib but has been off of coumadin    Was anemic with hgb 6.7 and is receiving 2 units prbc's  Was started on a cardizem gtt by hospital service  Normally takes digoxin for afib    Trying to obtain images from outside facility    Being evaluated by thoracic surgery    --will stop cardizem  --being transfused  --thoracic eval ongoing    Does not appear to require an ICU level of support at this time    Stacey Reyna MD

## 2018-01-08 NOTE — ROUTINE PROCESS
Bedside, Verbal and Written shift change report given to Marley Haas RN (oncoming nurse) by Cassie Ramires RN  (offgoing nurse). Report included the following information SBAR, Kardex, ED Summary, Procedure Summary, Intake/Output, MAR, Accordion and Recent Results.

## 2018-01-08 NOTE — NURSE NAVIGATOR
Chart reviewed by Heart Failure Nurse Navigator. Heart Failure database completed. EF Pending%. ACEi/ARB: echo pending. BB: Coreg 25 mg. CRT not indicated. NYHA Functional Class requested via provider message on [employer] . Heart Failure Teach Back in Patient Education. Heart Failure Avoiding Triggers on Discharge Instructions.

## 2018-01-08 NOTE — CONSULTS
[address] NOTE  Leah Armstrongfernando, [facility] office  [phone] NP in-hospital cell phone M-F until 4:30  After 5pm or on weekends, please call  for physician on call        NAME:  Jose Enrique Alexander   :   1945   MRN:   [id number]       Referring Physician: Randall Estrada    Consult Date: 2018 8:38 AM    Chief Complaint: possible GI bleed     History of Present Illness:  Patient is a 67 y.o. who is seen in consultation at the request of Dr. Gurmeet Guerra for possible GI bleed. Medical history as listed below includes COPD, atrial fibrillation (on coumadin), heart failure, pulmonary hypertension, and a lung mass. Presented to the hospital for shortness of breath and is being treated for pneumonia and pleural effusions. Coumadin, naproxin, and aspirin have been on hold for the past week for cataract extraction and planned lung biopsy for February. He denies other NSAID use. He reports that 2-3 weeks ago he had a few episodes of melena which he reports resolved after thoracentesis. At that time he was also told that he had iron deficiency. He denies further melena or hematochezia. He has chronic shortness of breath. He reports some chest pain. He reports increased fatigue especially with activity. He denies palpitations or dizziness. He denies nausea, reflux, dysphagia, abdominal pain, constipation, or diarrhea. Hemoglobin 6.7 with iron deficiency, INR 1.5 on admission. He reports having a colonoscopy in 2016 which had polyps with Dr. Ayde Lynn in [address]     I have reviewed the emergency room note, hospital admission note, notes by all other clinicians who have seen the patient during this hospitalization to date. I have reviewed the problem list and the reason for this hospitalization.  I have reviewed the allergies and the medications the patient was taking at home prior to this hospitalization. PMH:  No past medical history on file. PSH:  No past surgical history on file. Allergies:  No Known Allergies    Home Medications:  Prior to Admission Medications   Prescriptions Last Dose Informant Patient Reported? Taking?   aspirin delayed-release 81 mg tablet 1/1/2018 at Unknown time  Yes Yes   Sig: Take 81 mg by mouth daily. carvedilol (COREG) 25 mg tablet 1/6/2018 at Unknown time  Yes Yes   Sig: Take 25 mg by mouth two (2) times daily (with meals). digoxin (DIGITEK) 0.25 mg tablet 1/6/2018 at Unknown time  Yes Yes   Sig: Take 250 mcg by mouth daily. naproxen (NAPROSYN) 500 mg tablet 1/1/2018 at Unknown time  Yes Yes   Sig: Take 500 mg by mouth two (2) times daily (with meals). warfarin (COUMADIN) 5 mg tablet 1/1/2018 at Unknown time  Yes Yes   Sig: Take 5 mg by mouth daily.       Facility-Administered Medications: None       Hospital Medications:  Current Facility-Administered Medications   Medication Dose Route Frequency    carvedilol (COREG) tablet 25 mg  25 mg Oral BID WITH MEALS    digoxin (LANOXIN) tablet 0.125 mg  125 mcg Oral DAILY    acetaminophen (TYLENOL) tablet 650 mg  650 mg Oral Q4H PRN    HYDROcodone-acetaminophen (NORCO) 5-325 mg per tablet 1 Tab  1 Tab Oral Q4H PRN    morphine injection 2 mg  2 mg IntraVENous Q4H PRN    ondansetron (ZOFRAN) injection 4 mg  4 mg IntraVENous Q4H PRN    docusate sodium (COLACE) capsule 100 mg  100 mg Oral BID    predniSONE (DELTASONE) tablet 40 mg  40 mg Oral DAILY WITH BREAKFAST    albuterol-ipratropium (DUO-NEB) 2.5 MG-0.5 MG/3 ML  3 mL Nebulization Q4H PRN    lactobac ac& pc-s.therm-b.anim (SALLIE Q/RISAQUAD)  1 Cap Oral DAILY    levoFLOXacin (LEVAQUIN) 750 mg in D5W IVPB  750 mg IntraVENous Q24H    furosemide (LASIX) injection 40 mg  40 mg IntraVENous DAILY    dilTIAZem (CARDIZEM) 125 mg in dextrose 5% 125 mL infusion  0-15 mg/hr IntraVENous TITRATE    0.9% sodium chloride infusion 250 mL  250 mL IntraVENous PRN Social History:  Social History   Substance Use Topics    Smoking status: Not on file    Smokeless tobacco: Not on file    Alcohol use Not on file       Family History:  No family history on file.     Review of Systems:  Constitutional: negative fever, negative chills, negative weight loss  Eyes:   +visual changes  ENT:   negative sore throat, tongue or lip swelling  Respiratory:  negative cough, +dyspnea  Cards:  +for chest pain, negative palpitations, lower extremity edema  GI:   See HPI  :  negative for frequency, dysuria  Integument:  negative for rash and pruritus  Heme:  negative for easy bruising and gum/nose bleeding  Musculoskeletal:negative for myalgias, +back pain   Neuro:  negative for headaches, dizziness, vertigo  Psych:  negative for feelings of anxiety, depression     Objective:   Patient Vitals for the past 8 hrs:   BP Temp Pulse Resp SpO2 Height   01/08/18 0817 100/80 97.8 °F (36.6 °C) - - 94 % -   01/08/18 0755 108/67 98 °F (36.7 °C) (!) 118 24 97 % -   01/08/18 0739 - 97.9 °F (36.6 °C) - - - -   01/08/18 0714 - - - - - 5' 8.11\" (1.73 m)   01/08/18 0700 117/83 - (!) 113 22 96 % -   01/08/18 0600 101/64 - (!) 115 (!) 32 96 % -   01/08/18 0500 99/64 - 99 25 94 % -   01/08/18 0400 107/74 98.7 °F (37.1 °C) (!) 121 (!) 31 99 % -   01/08/18 0300 106/67 - (!) 117 24 98 % -   01/08/18 0200 (!) 117/92 - (!) 144 (!) 33 96 % -   01/08/18 0100 104/84 - (!) 128 26 97 % -     01/08 0701 - 01/08 1900  In: -   Out: 100 [Urine:100]  01/06 1901 - 01/08 0700  In: 11.2 [I.V.:11.2]  Out: 150 [Urine:150]    EXAM:     CONST:  Pleasant male lying in bed, no acute distress   NEURO:  alert and oriented x 3   HEENT: EOMI, no scleral icterus   LUNGS: clear to ausculation, diminished bases (-) wheeze   CARD:  irregular rhythm, tachycardic, S1 S2   ABD:  soft, no tenderness, no rebound, bowel sounds (+) all 4 quadrants, no masses, non distended   EXT:  no edema, warm; bilateral upper extremity congenital abnormalities    PSYCH: full, not anxious     Data Review     Recent Labs      01/08/18   0334   WBC  9.7   HGB  6.7*   HCT  22.0*   PLT  496*     Recent Labs      01/08/18   0334   NA  138   K  4.2   CL  108   CO2  23   BUN  15   CREA  1.00   GLU  91   PHOS  3.1   CA  8.8     Recent Labs      01/08/18   0334   SGOT  11*   AP  265*   TP  7.3   ALB  1.8*   GLOB  5.5*     Recent Labs      01/08/18   0334   INR  1.5*   PTP  14.9*        Assessment:   · Anemia: iron deficiency; concern for GI blood loss     Patient Active Problem List   Diagnosis Code    Recurrent left pleural effusion J90     Plan:   · Plan for EGD with Dr. Darlene Cantu pending pulmonary/cardiac clearance  · Stool hemoccult pending  · Start BID PPI  · Trend CBC, transfuse as necessary  · Avoid non-essential NSAIDs  · Further plans per Dr. Darlene Cantu  · Thank you for allowing me to participate in care of John Ni     Signed By: Anne Gregory NP     1/8/2018  8:38 AM       GI attending note:    Gen: NAD; Cardiac: nml S1, S2; Pulm: Decreased breath sounds, chest tube in place; Abd: normoactive BS, nt, nd, no rebound/guarding. Vitals, labs, and imaging reviewed. Agree with the history, physical, and plan of the NP. Empyema-planned for procedure tomorrow by Thoracic Surgery. No signs of active bleeding at this time. EGD 2 weeks ago with no acute findings per pt. Will plan for EGD on Wednesday/Thursday after CT procedure based on clinical source. PPI. Thank you for this consultation.     Dr. Darlene Cantu

## 2018-01-08 NOTE — CONSULTS
Consult    Subjective:     Loc Nelson is a 67 y.o.  male who is being seen for recurrent pleural effusion and lung mass. Onset of symptoms was gradual last week with worsening SOB over the weekend. He presented to [facility] for evaluation yesterday. According to the patient he was found on CXR last summer to have a left lower lobe lung mass by his PCP. He then underwent a CT of his chest in August 2016. He was scheduled for a biopsy following a repeat CT scan  in November 2016, however the lesion had resolved. He had pleural effusion that was tapped once according to him(these records are not available to us)   Patient denies fevers or cough. Previous studies include CXR done yesterday at [facility], these images are not available to us, that showed a recurrent pleural effusion. Past Med Hx is positive for: COPD, Pleural Effusion, LLL lung lesion, Pulmonary hypertension, A-fib, HTN, CHF, GI Bleed    No past surgical history on file. No family history on file.    Social History   Substance Use Topics    Smoking status: Not on file    Smokeless tobacco: Not on file    Alcohol use Not on file       Current Facility-Administered Medications   Medication Dose Route Frequency    carvedilol (COREG) tablet 25 mg  25 mg Oral BID WITH MEALS    digoxin (LANOXIN) tablet 0.125 mg  125 mcg Oral DAILY    acetaminophen (TYLENOL) tablet 650 mg  650 mg Oral Q4H PRN    HYDROcodone-acetaminophen (NORCO) 5-325 mg per tablet 1 Tab  1 Tab Oral Q4H PRN    morphine injection 2 mg  2 mg IntraVENous Q4H PRN    ondansetron (ZOFRAN) injection 4 mg  4 mg IntraVENous Q4H PRN    docusate sodium (COLACE) capsule 100 mg  100 mg Oral BID    predniSONE (DELTASONE) tablet 40 mg  40 mg Oral DAILY WITH BREAKFAST    albuterol-ipratropium (DUO-NEB) 2.5 MG-0.5 MG/3 ML  3 mL Nebulization Q4H PRN    lactobac ac& pc-s.therm-b.anim (SALLIE Q/RISAQUAD)  1 Cap Oral DAILY    levoFLOXacin (LEVAQUIN) 750 mg in D5W IVPB  750 mg IntraVENous Q24H    furosemide (LASIX) injection 40 mg  40 mg IntraVENous DAILY    dilTIAZem (CARDIZEM) 125 mg in dextrose 5% 125 mL infusion  0-15 mg/hr IntraVENous TITRATE    0.9% sodium chloride infusion 250 mL  250 mL IntraVENous PRN    pantoprazole (PROTONIX) tablet 40 mg  40 mg Oral ACB&D      No Known Allergies     Review of Systems:  Respiratory: positive for SOB, recurrent effusion  Cardiovascular: positive for A-fib  Gastrointestinal: positive for H/O GI lower GI bleed  Hematologic/Lymphatic: positive for anemia with H&H of 6.7/22  Musculoskeletal:negative  Neurological: negative  Behavioral/Psychiatric: negative  Allergic/Immunologic: negative     Objective: Intake and Output:    01/08 0701 - 01/08 1900  In: 20.3 [I.V.:20.3]  Out: 100 [Urine:100]  01/06 1901 - 01/08 0700  In: 11.2 [I.V.:11.2]  Out: 150 [Urine:150]    Physical Exam:   Visit Vitals    /70 (BP 1 Location: Left arm, BP Patient Position: At rest)    Pulse (!) 112    Temp 98 °F (36.7 °C)    Resp 29    Ht 5' 8.11\" (1.73 m)    Wt 142 lb 13.7 oz (64.8 kg)    SpO2 98%    BMI 21.65 kg/m2     General appearance: alert, cooperative, no distress, appears stated age  Lungs: clear to auscultation bilaterally, diminished breath sounds L base  Chest wall: no tenderness  Heart: irregularly irregular rhythm  Abdomen: soft, non-tender. Bowel sounds normal. No masses,  no organomegaly  Extremities: extremities normal, atraumatic, no cyanosis or edema  Pulses: 2+ and symmetric  Skin: Skin color, texture, turgor normal. No rashes or lesions    ECG:  A-fib with RVR  Chest CT on November 11, 2016: The  left lower lobe lesion which was scheduled for biopsy has almost completely  resolved. The biopsy was then canceled.  Suggest further follow-up in 3 months  with CT.        Data Review:   Recent Results (from the past 24 hour(s))   CK W/ CKMB & INDEX    Collection Time: 01/08/18  1:39 AM   Result Value Ref Range    CK 16 (L) 39 - 308 U/L    CK - MB <1.0 <3.6 NG/ML    CK-MB Index Cannot be calculated 0 - 2.5     TROPONIN I    Collection Time: 01/08/18  1:39 AM   Result Value Ref Range    Troponin-I, Qt. <0.04 <6.89 ng/mL   METABOLIC PANEL, COMPREHENSIVE    Collection Time: 01/08/18  3:34 AM   Result Value Ref Range    Sodium 138 136 - 145 mmol/L    Potassium 4.2 3.5 - 5.1 mmol/L    Chloride 108 97 - 108 mmol/L    CO2 23 21 - 32 mmol/L    Anion gap 7 5 - 15 mmol/L    Glucose 91 65 - 100 mg/dL    BUN 15 6 - 20 MG/DL    Creatinine 1.00 0.70 - 1.30 MG/DL    BUN/Creatinine ratio 15 12 - 20      GFR est AA >60 >60 ml/min/1.73m2    GFR est non-AA >60 >60 ml/min/1.73m2    Calcium 8.8 8.5 - 10.1 MG/DL    Bilirubin, total 0.8 0.2 - 1.0 MG/DL    ALT (SGPT) 16 12 - 78 U/L    AST (SGOT) 11 (L) 15 - 37 U/L    Alk. phosphatase 265 (H) 45 - 117 U/L    Protein, total 7.3 6.4 - 8.2 g/dL    Albumin 1.8 (L) 3.5 - 5.0 g/dL    Globulin 5.5 (H) 2.0 - 4.0 g/dL    A-G Ratio 0.3 (L) 1.1 - 2.2     CBC WITH AUTOMATED DIFF    Collection Time: 01/08/18  3:34 AM   Result Value Ref Range    WBC 9.7 4.1 - 11.1 K/uL    RBC 2.61 (L) 4.10 - 5.70 M/uL    HGB 6.7 (L) 12.1 - 17.0 g/dL    HCT 22.0 (L) 36.6 - 50.3 %    MCV 84.3 80.0 - 99.0 FL    MCH 25.7 (L) 26.0 - 34.0 PG    MCHC 30.5 30.0 - 36.5 g/dL    RDW 19.1 (H) 11.5 - 14.5 %    PLATELET 415 (H) 461 - 400 K/uL    NEUTROPHILS 70 32 - 75 %    LYMPHOCYTES 18 12 - 49 %    MONOCYTES 11 5 - 13 %    EOSINOPHILS 1 0 - 7 %    BASOPHILS 0 0 - 1 %    ABS. NEUTROPHILS 6.8 1.8 - 8.0 K/UL    ABS. LYMPHOCYTES 1.7 0.8 - 3.5 K/UL    ABS. MONOCYTES 1.1 (H) 0.0 - 1.0 K/UL    ABS. EOSINOPHILS 0.1 0.0 - 0.4 K/UL    ABS.  BASOPHILS 0.0 0.0 - 0.1 K/UL    DF SMEAR SCANNED      PLATELET COMMENTS LARGE PLATELETS      RBC COMMENTS ANISOCYTOSIS  1+        RBC COMMENTS MICROCYTOSIS  1+        RBC COMMENTS HYPOCHROMIA  1+        RBC COMMENTS POLYCHROMASIA  1+       PHOSPHORUS    Collection Time: 01/08/18  3:34 AM   Result Value Ref Range    Phosphorus 3.1 2.6 - 4.7 MG/DL   MAGNESIUM    Collection Time: 01/08/18  3:34 AM   Result Value Ref Range    Magnesium 1.9 1.6 - 2.4 mg/dL   TSH 3RD GENERATION    Collection Time: 01/08/18  3:34 AM   Result Value Ref Range    TSH 0.69 0.36 - 3.74 uIU/mL   PROTHROMBIN TIME + INR    Collection Time: 01/08/18  3:34 AM   Result Value Ref Range    INR 1.5 (H) 0.9 - 1.1      Prothrombin time 14.9 (H) 9.0 - 11.1 sec   LACTIC ACID    Collection Time: 01/08/18  3:34 AM   Result Value Ref Range    Lactic acid 1.4 0.4 - 2.0 MMOL/L   FOLATE    Collection Time: 01/08/18  3:34 AM   Result Value Ref Range    Folate 26.6 (H) 5.0 - 21.0 ng/mL   VITAMIN B12    Collection Time: 01/08/18  3:34 AM   Result Value Ref Range    Vitamin B12 805 211 - 911 pg/mL   IRON    Collection Time: 01/08/18  3:34 AM   Result Value Ref Range    Iron 18 (L) 35 - 150 ug/dL   IRON PROFILE    Collection Time: 01/08/18  3:34 AM   Result Value Ref Range    Iron 19 (L) 35 - 150 ug/dL    TIBC 137 (L) 250 - 450 ug/dL    Iron % saturation 14 (L) 20 - 50 %   FERRITIN    Collection Time: 01/08/18  3:34 AM   Result Value Ref Range    Ferritin 767 (H) 26 - 388 NG/ML   TYPE + CROSSMATCH    Collection Time: 01/08/18  5:45 AM   Result Value Ref Range    Crossmatch Expiration 01/11/2018     ABO/Rh(D) O NEGATIVE     Antibody screen NEG     Unit number [id number]     Blood component type RC LR AS1     Unit division 00     Status of unit ISSUED     Crossmatch result Compatible     Unit number [id number]     Blood component type RC LR AS1     Unit division 00     Status of unit ALLOCATED     Crossmatch result Compatible        Chest x-ray done at [facility] is not available to us    Assessment:     Principal Problem:    Recurrent left pleural effusion (1/8/2018)        Plan:   As imaging is not available from [facility]:   Will repeat CXR, to evaluate possible effusion and or lung mass  If needed will obtain chest CT     Signed By: Rasta Murillo NP January 8, 2018       I personally saw and examined Mr. Munira Coleman on rounds. He  is pleasant 66 y/o gentleman admitted for a recurrent left pleural effusion, dyspnea and acute blood loss anemia. He had a Thoracentesis at [facility] in Dec 2017./ He is current receiving 2u PRBCs. On exam he does appear slightly uncomfortable and somewhat dyspneic. He is thin appearing. I personally reviewed his Chest CT from [facility]. He has a large left pleural effusion with a possible loculation. In addition there is collapse/compression of his LLL. It is unclear because of the effusion and compression if there is a ERUM mass. No lymphadenopathy noted. Diagnoses  1- Left pleural effusion  2- ERUM mass  3- Acute blood loss anemia    Plan to place a left chest tube and resend Cytology, culture and chemistry. Once pleural space is drained will request a repeat Chest CT to better characterize ERUM mass.   We will then decide based on all of the above re: possible VATS pleurodesis etc.

## 2018-01-08 NOTE — PROGRESS NOTES
TRANSFER - IN REPORT:    Verbal report received from Maribell Portillo RN (name) on Waldo  being received from [facility] (unit) for urgent transfer      Report consisted of patients Situation, Background, Assessment and   Recommendations(SBAR). Information from the following report(s) SBAR and Cardiac Rhythm AFib was reviewed with the receiving nurse. Opportunity for questions and clarification was provided. Assessment completed upon patients arrival to unit and care assumed. 2220: Patient arrival. Patient does not have a PE (pulmonary embolism) as given in report but rather pleural effusions. Patient stated he was to see a physician on the 16th of this month for a f/u of mass on L upper lobe. Patient is A&Ox4, denies SOB or discomfort and sats adequate on RA. Patient in AFib and ST. Per patient he is not on coumadin or any other therapy for AFib due to his cataract surgery a few months prior. Patient further stated he is to resume coumadin in February. Dr. Malcom Monteiro paged at this time. Immediate call back from Dr. Emiliano Randolph. Dr. Emiliano Randolph requested I page Dr. Malcom Monteiro again. 2230: Obtained Dr. Eh Castro extension from ED. Dr. Malcom Monteiro stated he would have Dr. Scarlett Gutierrez come see patient. 2306: Paged Dr. Scarlett Gutierrez.  2308: Spoke with Dr. Scarlett Gutierrez briefly. Code in CCU Dr. Scarlett Gutierrez to call back. 2315: Per Dr. Scarlett Gutierrez he will send Dr. Emiliano Randolph up as he is still in code. 2330: Dr. Emiliano Randolph up to assess patient. I provided Dr. Emiliano Randolph with all documentation given to me by transport. Primary Nurse Carlton Rosario and Lenore Augustin RN performed a dual skin assessment on this patient No impairment noted  Gustavo score is 19. Problem: Falls - Risk of  Goal: *Absence of Falls  Document Beau Fall Risk and appropriate interventions in the flowsheet.    Outcome: Progressing Towards Goal  Fall Risk Interventions:  Mobility Interventions: Patient to call before getting OOB              Elimination Interventions: Patient to call for help with toileting needs, Urinal in reach, Call light in reach

## 2018-01-08 NOTE — PROGRESS NOTES
Hospitalist Progress Note  Elvin Maynard MD  Answering service: [phone] from in house phone  Cell:       Date of Service:  2018  NAME:  Carolyn Wilson  :  1945  MRN:  [id number]      Admission Summary:   68 yo male with PMHx of COPD, Hx of Left pleural effusion, Left lung mass, Atrial Fib, HTN, Hx of GI bleed s/p EGD 2017, taken off Asa/coumadin and Naproxen at that time. Pt states he had dark BMs before Luis Alfredo but they have resolved and have not recurred. Pt now admitted for worsening SOB over past 1 week that is worse with exertion. Pt denies any chest pain, fever, chills, cough, n/v, diarrhea, abdominal pain. Pt went to [facility] who transferred patient to [facility] for possible VATS. Interval history / Subjective:     Pt seen and examined  Doing well   No chest pain, sob, nv/d.       Assessment & Plan:     Recurrent Large Left Pleural effusion, r/o malignancy  - hx of COPD , Ex-smoker  - reviewed CTA chest with Thoracic surgery  - possible plan for Chest tube this afternoon, will send routine labs including cytology  - Re-image once lung expands then consider VATS    Hx of Lung Mass  - not seen clearly on CTA chest   - will need repeat CT at some point prior to VATs  - Thoracic surgery following    Atrial Fib with RVR  - improved with Cardizem gtt  - now on Coreg and Dig  - not on Coumadin/ASA since 2017 due to GI Bleed   - Plan to start Eliquis next month    Anemia, appears Chronic  - hb 8.2 at [facility]  - unclear if was lab error  - 2U PRBCs being transfused  - recent GI work up included EGD which did not show active bleeding per patient  - patient is to have repeat EGD and Colon at some point per patient  - plan for EGD in AM   - PPI BID     COPD, chronic  - does not appear to have exacerbation  - d/c steroids  - Nebs PRN     Compressive Atelectasis  - no obvious symptoms of Pneumonia at this time  - empirically on Levaquin, will continue for time being    Code status: FULL  DVT prophylaxis: SCDs    Care Plan discussed with: Patient/Family, Nurse and Consultant Thoracic surgery  Disposition: Home w/Family     Hospital Problems  Date Reviewed: 1/8/2018          Codes Class Noted POA    * (Principal)Recurrent left pleural effusion ICD-10-CM: J90  ICD-9-CM: 511.9  1/8/2018 Yes                Review of Systems:   A comprehensive review of systems was negative except for that written in the HPI. Vital Signs:    Last 24hrs VS reviewed since prior progress note. Most recent are:  Visit Vitals    /82 (BP 1 Location: Left arm, BP Patient Position: At rest)    Pulse (!) 111    Temp 98.2 °F (36.8 °C)    Resp 28    Ht 5' 8.11\" (1.73 m)    Wt 64.8 kg (142 lb 13.7 oz)    SpO2 99%    BMI 21.65 kg/m2         Intake/Output Summary (Last 24 hours) at 01/08/18 1143  Last data filed at 01/08/18 1100   Gross per 24 hour   Intake           348.24 ml   Output              250 ml   Net            98.24 ml        Physical Examination:             Constitutional:  No acute distress, cooperative, pleasant    ENT:  Oral mucous moist,    Resp:  clear on Rt, diminished on left   CV:  Irregular     GI:  Soft, non distended, non tender. normoactive bowel sounds    Musculoskeletal:  No edema, warm, 2+ pulses throughout    Neurologic:  Moves all extremities.   AAOx3, RUE contracture-chronic      Skin:  Good turgor, no rashes or ulcers       Data Review:    Review and/or order of clinical lab test  Review and/or order of tests in the radiology section of CPT  Review and/or order of tests in the medicine section of CPT      Labs:     Recent Labs      01/08/18   0334   WBC  9.7   HGB  6.7*   HCT  22.0*   PLT  496*     Recent Labs      01/08/18   0334   NA  138   K  4.2   CL  108   CO2  23   BUN  15   CREA  1.00   GLU  91   CA  8.8   MG  1.9   PHOS  3.1     Recent Labs      01/08/18   0334   SGOT  11* ALT  16   AP  265*   TBILI  0.8   TP  7.3   ALB  1.8*   GLOB  5.5*     Recent Labs      01/08/18   0334   INR  1.5*   PTP  14.9*      Recent Labs      01/08/18   0334   TIBC  137*   PSAT  14*   FERR  767*      Lab Results   Component Value Date/Time    Folate 26.6 01/08/2018 03:34 AM      No results for input(s): PH, PCO2, PO2 in the last 72 hours.   Recent Labs      01/08/18   0139   CPK  16*   CKNDX  Cannot be calculated   TROIQ  <0.04     No results found for: CHOL, CHOLX, CHLST, CHOLV, HDL, LDL, LDLC, DLDLP, TGLX, TRIGL, TRIGP, CHHD, CHHDX  No results found for: GLUCPOC  No results found for: COLOR, APPRN, SPGRU, REFSG, JEANNINE, PROTU, GLUCU, KETU, BILU, UROU, ANKUR, LEUKU, GLUKE, EPSU, BACTU, WBCU, RBCU, CASTS, UCRY      Medications Reviewed:     Current Facility-Administered Medications   Medication Dose Route Frequency    carvedilol (COREG) tablet 25 mg  25 mg Oral BID WITH MEALS    digoxin (LANOXIN) tablet 0.125 mg  125 mcg Oral DAILY    acetaminophen (TYLENOL) tablet 650 mg  650 mg Oral Q4H PRN    HYDROcodone-acetaminophen (NORCO) 5-325 mg per tablet 1 Tab  1 Tab Oral Q4H PRN    morphine injection 2 mg  2 mg IntraVENous Q4H PRN    ondansetron (ZOFRAN) injection 4 mg  4 mg IntraVENous Q4H PRN    docusate sodium (COLACE) capsule 100 mg  100 mg Oral BID    predniSONE (DELTASONE) tablet 40 mg  40 mg Oral DAILY WITH BREAKFAST    albuterol-ipratropium (DUO-NEB) 2.5 MG-0.5 MG/3 ML  3 mL Nebulization Q4H PRN    lactobac ac& pc-s.therm-b.anim (SALLIE Q/RISAQUAD)  1 Cap Oral DAILY    levoFLOXacin (LEVAQUIN) 750 mg in D5W IVPB  750 mg IntraVENous Q24H    furosemide (LASIX) injection 40 mg  40 mg IntraVENous DAILY    0.9% sodium chloride infusion 250 mL  250 mL IntraVENous PRN    pantoprazole (PROTONIX) tablet 40 mg  40 mg Oral ACB&D     ______________________________________________________________________  EXPECTED LENGTH OF STAY: - - -  ACTUAL LENGTH OF STAY:          1                 Santiago Pressley, MD

## 2018-01-09 ENCOUNTER — ANESTHESIA EVENT (OUTPATIENT)
Dept: SURGERY | Age: 73
DRG: 164 | End: 2018-01-09
Payer: MEDICARE

## 2018-01-09 ENCOUNTER — APPOINTMENT (OUTPATIENT)
Dept: GENERAL RADIOLOGY | Age: 73
DRG: 164 | End: 2018-01-09
Attending: THORACIC SURGERY (CARDIOTHORACIC VASCULAR SURGERY)
Payer: MEDICARE

## 2018-01-09 ENCOUNTER — ANESTHESIA (OUTPATIENT)
Dept: SURGERY | Age: 73
DRG: 164 | End: 2018-01-09
Payer: MEDICARE

## 2018-01-09 LAB
ANION GAP SERPL CALC-SCNC: 13 MMOL/L (ref 5–15)
APPEARANCE FLD: ABNORMAL
BASOPHILS # BLD: 0 K/UL (ref 0–0.1)
BASOPHILS NFR BLD: 0 % (ref 0–1)
BUN SERPL-MCNC: 20 MG/DL (ref 6–20)
BUN/CREAT SERPL: 15 (ref 12–20)
CALCIUM SERPL-MCNC: 7.1 MG/DL (ref 8.5–10.1)
CHLORIDE SERPL-SCNC: 106 MMOL/L (ref 97–108)
CO2 SERPL-SCNC: 19 MMOL/L (ref 21–32)
COLOR FLD: ABNORMAL
CREAT SERPL-MCNC: 1.37 MG/DL (ref 0.7–1.3)
EOSINOPHIL # BLD: 0 K/UL (ref 0–0.4)
EOSINOPHIL NFR BLD: 0 % (ref 0–7)
ERYTHROCYTE [DISTWIDTH] IN BLOOD BY AUTOMATED COUNT: 18.3 % (ref 11.5–14.5)
GLUCOSE BLD STRIP.AUTO-MCNC: 104 MG/DL (ref 65–100)
GLUCOSE SERPL-MCNC: 298 MG/DL (ref 65–100)
HCT VFR BLD AUTO: 25.4 % (ref 36.6–50.3)
HGB BLD-MCNC: 8.2 G/DL (ref 12.1–17)
HGB BLD-MCNC: 9.3 G/DL (ref 12.1–17)
HGB BLD-MCNC: 9.3 G/DL (ref 12.1–17)
HGB BLD-MCNC: 9.7 G/DL (ref 12.1–17)
LYMPHOCYTES # BLD: 0.8 K/UL (ref 0.8–3.5)
LYMPHOCYTES NFR BLD: 8 % (ref 12–49)
MCH RBC QN AUTO: 26.8 PG (ref 26–34)
MCHC RBC AUTO-ENTMCNC: 32.3 G/DL (ref 30–36.5)
MCV RBC AUTO: 83 FL (ref 80–99)
MONOCYTES # BLD: 0.8 K/UL (ref 0–1)
MONOCYTES NFR BLD: 8 % (ref 5–13)
NEUTROPHILS NFR FLD: 0 %
NEUTS SEG # BLD: 8.2 K/UL (ref 1.8–8)
NEUTS SEG NFR BLD: 84 % (ref 32–75)
NRBC # BLD: 0.02 K/UL (ref 0–0.01)
NRBC BLD-RTO: 0.2 PER 100 WBC
NUC CELL # FLD: ABNORMAL /CU MM (ref 0–5)
PATH REV BLD -IMP: ABNORMAL
PLATELET # BLD AUTO: 462 K/UL (ref 150–400)
PLATELET COMMENTS,PCOM: ABNORMAL
POTASSIUM SERPL-SCNC: 3.6 MMOL/L (ref 3.5–5.1)
RBC # BLD AUTO: 3.06 M/UL (ref 4.1–5.7)
RBC # FLD: >100 /CU MM
RBC MORPH BLD: ABNORMAL
SERVICE CMNT-IMP: ABNORMAL
SODIUM SERPL-SCNC: 138 MMOL/L (ref 136–145)
SPECIMEN SOURCE FLD: ABNORMAL
TOTAL CELLS COUNTED SPEC: 0
WBC # BLD AUTO: 9.8 K/UL (ref 4.1–11.1)
WBC NRBC COR # BLD: ABNORMAL 10*3/UL

## 2018-01-09 PROCEDURE — 77030010507 HC ADH SKN DERMBND J&J -B: Performed by: THORACIC SURGERY (CARDIOTHORACIC VASCULAR SURGERY)

## 2018-01-09 PROCEDURE — 77030011640 HC PAD GRND REM COVD -A: Performed by: THORACIC SURGERY (CARDIOTHORACIC VASCULAR SURGERY)

## 2018-01-09 PROCEDURE — 0BCJ0ZZ EXTIRPATION OF MATTER FROM LEFT LOWER LUNG LOBE, OPEN APPROACH: ICD-10-PCS | Performed by: THORACIC SURGERY (CARDIOTHORACIC VASCULAR SURGERY)

## 2018-01-09 PROCEDURE — 77030032490 HC SLV COMPR SCD KNE COVD -B: Performed by: THORACIC SURGERY (CARDIOTHORACIC VASCULAR SURGERY)

## 2018-01-09 PROCEDURE — 77030020747 HC TU INSUF ENDOSC TELE -A: Performed by: THORACIC SURGERY (CARDIOTHORACIC VASCULAR SURGERY)

## 2018-01-09 PROCEDURE — 77030031139 HC SUT VCRL2 J&J -A: Performed by: THORACIC SURGERY (CARDIOTHORACIC VASCULAR SURGERY)

## 2018-01-09 PROCEDURE — 74011000250 HC RX REV CODE- 250: Performed by: THORACIC SURGERY (CARDIOTHORACIC VASCULAR SURGERY)

## 2018-01-09 PROCEDURE — 77030005401 HC CATH RAD ARRO -A: Performed by: ANESTHESIOLOGY

## 2018-01-09 PROCEDURE — 77030012390 HC DRN CHST BTL GTNG -B: Performed by: THORACIC SURGERY (CARDIOTHORACIC VASCULAR SURGERY)

## 2018-01-09 PROCEDURE — 77030018836 HC SOL IRR NACL ICUM -A: Performed by: THORACIC SURGERY (CARDIOTHORACIC VASCULAR SURGERY)

## 2018-01-09 PROCEDURE — 0W9B30Z DRAINAGE OF LEFT PLEURAL CAVITY WITH DRAINAGE DEVICE, PERCUTANEOUS APPROACH: ICD-10-PCS | Performed by: THORACIC SURGERY (CARDIOTHORACIC VASCULAR SURGERY)

## 2018-01-09 PROCEDURE — 77030002996 HC SUT SLK J&J -A: Performed by: THORACIC SURGERY (CARDIOTHORACIC VASCULAR SURGERY)

## 2018-01-09 PROCEDURE — 82962 GLUCOSE BLOOD TEST: CPT

## 2018-01-09 PROCEDURE — 77030020782 HC GWN BAIR PAWS FLX 3M -B

## 2018-01-09 PROCEDURE — 77030018846 HC SOL IRR STRL H20 ICUM -A: Performed by: THORACIC SURGERY (CARDIOTHORACIC VASCULAR SURGERY)

## 2018-01-09 PROCEDURE — 77030003666 HC NDL SPINAL BD -A: Performed by: THORACIC SURGERY (CARDIOTHORACIC VASCULAR SURGERY)

## 2018-01-09 PROCEDURE — 74011250637 HC RX REV CODE- 250/637: Performed by: INTERNAL MEDICINE

## 2018-01-09 PROCEDURE — P9045 ALBUMIN (HUMAN), 5%, 250 ML: HCPCS

## 2018-01-09 PROCEDURE — 74011000250 HC RX REV CODE- 250: Performed by: HOSPITALIST

## 2018-01-09 PROCEDURE — 85025 COMPLETE CBC W/AUTO DIFF WBC: CPT | Performed by: HOSPITALIST

## 2018-01-09 PROCEDURE — 74011250637 HC RX REV CODE- 250/637: Performed by: THORACIC SURGERY (CARDIOTHORACIC VASCULAR SURGERY)

## 2018-01-09 PROCEDURE — 77030008756 HC TU IRR SUC STRY -B: Performed by: THORACIC SURGERY (CARDIOTHORACIC VASCULAR SURGERY)

## 2018-01-09 PROCEDURE — 74011250636 HC RX REV CODE- 250/636: Performed by: THORACIC SURGERY (CARDIOTHORACIC VASCULAR SURGERY)

## 2018-01-09 PROCEDURE — 77030035031: Performed by: THORACIC SURGERY (CARDIOTHORACIC VASCULAR SURGERY)

## 2018-01-09 PROCEDURE — 77030005402 HC CATH RAD ART LN KT TELE -B

## 2018-01-09 PROCEDURE — 77030013797 HC KT TRNSDUC PRSSR EDWD -A: Performed by: ANESTHESIOLOGY

## 2018-01-09 PROCEDURE — 74011250636 HC RX REV CODE- 250/636: Performed by: INTERNAL MEDICINE

## 2018-01-09 PROCEDURE — 76010000173 HC OR TIME 3 TO 3.5 HR INTENSV-TIER 1: Performed by: THORACIC SURGERY (CARDIOTHORACIC VASCULAR SURGERY)

## 2018-01-09 PROCEDURE — 74011250637 HC RX REV CODE- 250/637: Performed by: NURSE PRACTITIONER

## 2018-01-09 PROCEDURE — 36415 COLL VENOUS BLD VENIPUNCTURE: CPT | Performed by: HOSPITALIST

## 2018-01-09 PROCEDURE — 71045 X-RAY EXAM CHEST 1 VIEW: CPT

## 2018-01-09 PROCEDURE — 80048 BASIC METABOLIC PNL TOTAL CA: CPT | Performed by: HOSPITALIST

## 2018-01-09 PROCEDURE — 77030013079 HC BLNKT BAIR HGGR 3M -A: Performed by: ANESTHESIOLOGY

## 2018-01-09 PROCEDURE — 74011000250 HC RX REV CODE- 250

## 2018-01-09 PROCEDURE — 77030011264 HC ELECTRD BLD EXT COVD -A: Performed by: THORACIC SURGERY (CARDIOTHORACIC VASCULAR SURGERY)

## 2018-01-09 PROCEDURE — 74011000258 HC RX REV CODE- 258: Performed by: HOSPITALIST

## 2018-01-09 PROCEDURE — 77030018673: Performed by: THORACIC SURGERY (CARDIOTHORACIC VASCULAR SURGERY)

## 2018-01-09 PROCEDURE — 65610000006 HC RM INTENSIVE CARE

## 2018-01-09 PROCEDURE — 74011250636 HC RX REV CODE- 250/636

## 2018-01-09 PROCEDURE — 74011000258 HC RX REV CODE- 258: Performed by: ANESTHESIOLOGY

## 2018-01-09 PROCEDURE — 76210000006 HC OR PH I REC 0.5 TO 1 HR: Performed by: THORACIC SURGERY (CARDIOTHORACIC VASCULAR SURGERY)

## 2018-01-09 PROCEDURE — 88305 TISSUE EXAM BY PATHOLOGIST: CPT | Performed by: THORACIC SURGERY (CARDIOTHORACIC VASCULAR SURGERY)

## 2018-01-09 PROCEDURE — 74011250636 HC RX REV CODE- 250/636: Performed by: ANESTHESIOLOGY

## 2018-01-09 PROCEDURE — 88331 PATH CONSLTJ SURG 1 BLK 1SPC: CPT | Performed by: THORACIC SURGERY (CARDIOTHORACIC VASCULAR SURGERY)

## 2018-01-09 PROCEDURE — 85018 HEMOGLOBIN: CPT

## 2018-01-09 PROCEDURE — 77030032060 HC PWDR HEMSTAT ARISTA ASRB 3GM BARD -C: Performed by: THORACIC SURGERY (CARDIOTHORACIC VASCULAR SURGERY)

## 2018-01-09 PROCEDURE — 77030013797 HC KT TRNSDUC PRSSR EDWD -A

## 2018-01-09 PROCEDURE — 74011250636 HC RX REV CODE- 250/636: Performed by: HOSPITALIST

## 2018-01-09 PROCEDURE — 77030008671 HC TU ENDO/BRNC CUF COVD -B: Performed by: ANESTHESIOLOGY

## 2018-01-09 PROCEDURE — 76060000037 HC ANESTHESIA 3 TO 3.5 HR: Performed by: THORACIC SURGERY (CARDIOTHORACIC VASCULAR SURGERY)

## 2018-01-09 RX ORDER — SENNOSIDES 8.6 MG/1
1 TABLET ORAL
Status: DISCONTINUED | OUTPATIENT
Start: 2018-01-09 | End: 2018-01-14 | Stop reason: HOSPADM

## 2018-01-09 RX ORDER — SODIUM CHLORIDE 9 MG/ML
50 INJECTION, SOLUTION INTRAVENOUS CONTINUOUS
Status: DISPENSED | OUTPATIENT
Start: 2018-01-09 | End: 2018-01-10

## 2018-01-09 RX ORDER — MORPHINE SULFATE 10 MG/ML
2 INJECTION, SOLUTION INTRAMUSCULAR; INTRAVENOUS
Status: DISCONTINUED | OUTPATIENT
Start: 2018-01-09 | End: 2018-01-09 | Stop reason: HOSPADM

## 2018-01-09 RX ORDER — MIDAZOLAM HYDROCHLORIDE 1 MG/ML
1 INJECTION, SOLUTION INTRAMUSCULAR; INTRAVENOUS AS NEEDED
Status: DISCONTINUED | OUTPATIENT
Start: 2018-01-09 | End: 2018-01-09 | Stop reason: HOSPADM

## 2018-01-09 RX ORDER — HYDROMORPHONE HCL IN 0.9% NACL 15 MG/30ML
PATIENT CONTROLLED ANALGESIA VIAL INTRAVENOUS CONTINUOUS
Status: DISCONTINUED | OUTPATIENT
Start: 2018-01-09 | End: 2018-01-13

## 2018-01-09 RX ORDER — ROCURONIUM BROMIDE 10 MG/ML
INJECTION, SOLUTION INTRAVENOUS AS NEEDED
Status: DISCONTINUED | OUTPATIENT
Start: 2018-01-09 | End: 2018-01-09 | Stop reason: HOSPADM

## 2018-01-09 RX ORDER — LEVOFLOXACIN 5 MG/ML
750 INJECTION, SOLUTION INTRAVENOUS
Status: DISCONTINUED | OUTPATIENT
Start: 2018-01-11 | End: 2018-01-14 | Stop reason: HOSPADM

## 2018-01-09 RX ORDER — OXYCODONE AND ACETAMINOPHEN 5; 325 MG/1; MG/1
1 TABLET ORAL AS NEEDED
Status: DISCONTINUED | OUTPATIENT
Start: 2018-01-09 | End: 2018-01-09 | Stop reason: HOSPADM

## 2018-01-09 RX ORDER — SODIUM CHLORIDE, SODIUM LACTATE, POTASSIUM CHLORIDE, CALCIUM CHLORIDE 600; 310; 30; 20 MG/100ML; MG/100ML; MG/100ML; MG/100ML
125 INJECTION, SOLUTION INTRAVENOUS CONTINUOUS
Status: DISCONTINUED | OUTPATIENT
Start: 2018-01-09 | End: 2018-01-09 | Stop reason: HOSPADM

## 2018-01-09 RX ORDER — SODIUM CHLORIDE 9 MG/ML
1000 INJECTION, SOLUTION INTRAVENOUS CONTINUOUS
Status: DISCONTINUED | OUTPATIENT
Start: 2018-01-09 | End: 2018-01-09 | Stop reason: HOSPADM

## 2018-01-09 RX ORDER — MIDAZOLAM HYDROCHLORIDE 1 MG/ML
INJECTION, SOLUTION INTRAMUSCULAR; INTRAVENOUS AS NEEDED
Status: DISCONTINUED | OUTPATIENT
Start: 2018-01-09 | End: 2018-01-09 | Stop reason: HOSPADM

## 2018-01-09 RX ORDER — OXYCODONE AND ACETAMINOPHEN 5; 325 MG/1; MG/1
2 TABLET ORAL
Status: DISCONTINUED | OUTPATIENT
Start: 2018-01-09 | End: 2018-01-14 | Stop reason: HOSPADM

## 2018-01-09 RX ORDER — FENTANYL CITRATE 50 UG/ML
50 INJECTION, SOLUTION INTRAMUSCULAR; INTRAVENOUS AS NEEDED
Status: DISCONTINUED | OUTPATIENT
Start: 2018-01-09 | End: 2018-01-09 | Stop reason: HOSPADM

## 2018-01-09 RX ORDER — VANCOMYCIN/0.9 % SOD CHLORIDE 1 G/100 ML
1000 PLASTIC BAG, INJECTION (ML) INTRAVENOUS EVERY 24 HOURS
Status: DISCONTINUED | OUTPATIENT
Start: 2018-01-09 | End: 2018-01-10

## 2018-01-09 RX ORDER — SODIUM CHLORIDE 0.9 % (FLUSH) 0.9 %
5-10 SYRINGE (ML) INJECTION EVERY 8 HOURS
Status: DISCONTINUED | OUTPATIENT
Start: 2018-01-09 | End: 2018-01-09 | Stop reason: HOSPADM

## 2018-01-09 RX ORDER — SODIUM CHLORIDE 0.9 % (FLUSH) 0.9 %
5-10 SYRINGE (ML) INJECTION AS NEEDED
Status: DISCONTINUED | OUTPATIENT
Start: 2018-01-09 | End: 2018-01-09 | Stop reason: HOSPADM

## 2018-01-09 RX ORDER — FENTANYL CITRATE 50 UG/ML
25 INJECTION, SOLUTION INTRAMUSCULAR; INTRAVENOUS
Status: DISCONTINUED | OUTPATIENT
Start: 2018-01-09 | End: 2018-01-09 | Stop reason: HOSPADM

## 2018-01-09 RX ORDER — SODIUM CHLORIDE 9 MG/ML
250 INJECTION, SOLUTION INTRAVENOUS AS NEEDED
Status: DISCONTINUED | OUTPATIENT
Start: 2018-01-09 | End: 2018-01-09 | Stop reason: HOSPADM

## 2018-01-09 RX ORDER — SODIUM CHLORIDE 0.9 % (FLUSH) 0.9 %
5-10 SYRINGE (ML) INJECTION AS NEEDED
Status: DISCONTINUED | OUTPATIENT
Start: 2018-01-09 | End: 2018-01-14 | Stop reason: HOSPADM

## 2018-01-09 RX ORDER — SODIUM CHLORIDE 0.9 % (FLUSH) 0.9 %
5-10 SYRINGE (ML) INJECTION EVERY 8 HOURS
Status: DISCONTINUED | OUTPATIENT
Start: 2018-01-09 | End: 2018-01-14 | Stop reason: HOSPADM

## 2018-01-09 RX ORDER — ALBUMIN HUMAN 50 G/1000ML
SOLUTION INTRAVENOUS AS NEEDED
Status: DISCONTINUED | OUTPATIENT
Start: 2018-01-09 | End: 2018-01-09 | Stop reason: HOSPADM

## 2018-01-09 RX ORDER — FENTANYL CITRATE 50 UG/ML
INJECTION, SOLUTION INTRAMUSCULAR; INTRAVENOUS AS NEEDED
Status: DISCONTINUED | OUTPATIENT
Start: 2018-01-09 | End: 2018-01-09 | Stop reason: HOSPADM

## 2018-01-09 RX ORDER — LIDOCAINE HYDROCHLORIDE 10 MG/ML
0.1 INJECTION, SOLUTION EPIDURAL; INFILTRATION; INTRACAUDAL; PERINEURAL AS NEEDED
Status: DISCONTINUED | OUTPATIENT
Start: 2018-01-09 | End: 2018-01-09 | Stop reason: HOSPADM

## 2018-01-09 RX ORDER — ONDANSETRON 2 MG/ML
4 INJECTION INTRAMUSCULAR; INTRAVENOUS AS NEEDED
Status: DISCONTINUED | OUTPATIENT
Start: 2018-01-09 | End: 2018-01-09 | Stop reason: HOSPADM

## 2018-01-09 RX ORDER — DIPHENHYDRAMINE HYDROCHLORIDE 50 MG/ML
12.5 INJECTION, SOLUTION INTRAMUSCULAR; INTRAVENOUS AS NEEDED
Status: DISCONTINUED | OUTPATIENT
Start: 2018-01-09 | End: 2018-01-09 | Stop reason: HOSPADM

## 2018-01-09 RX ORDER — LIDOCAINE HYDROCHLORIDE 20 MG/ML
INJECTION, SOLUTION EPIDURAL; INFILTRATION; INTRACAUDAL; PERINEURAL AS NEEDED
Status: DISCONTINUED | OUTPATIENT
Start: 2018-01-09 | End: 2018-01-09 | Stop reason: HOSPADM

## 2018-01-09 RX ORDER — SUCCINYLCHOLINE CHLORIDE 20 MG/ML
INJECTION INTRAMUSCULAR; INTRAVENOUS AS NEEDED
Status: DISCONTINUED | OUTPATIENT
Start: 2018-01-09 | End: 2018-01-09 | Stop reason: HOSPADM

## 2018-01-09 RX ORDER — HYDROMORPHONE HCL IN 0.9% NACL 15 MG/30ML
PATIENT CONTROLLED ANALGESIA VIAL INTRAVENOUS
Status: DISPENSED
Start: 2018-01-09 | End: 2018-01-10

## 2018-01-09 RX ORDER — SODIUM CHLORIDE, SODIUM LACTATE, POTASSIUM CHLORIDE, CALCIUM CHLORIDE 600; 310; 30; 20 MG/100ML; MG/100ML; MG/100ML; MG/100ML
INJECTION, SOLUTION INTRAVENOUS
Status: DISCONTINUED | OUTPATIENT
Start: 2018-01-09 | End: 2018-01-09 | Stop reason: HOSPADM

## 2018-01-09 RX ORDER — PROPOFOL 10 MG/ML
INJECTION, EMULSION INTRAVENOUS AS NEEDED
Status: DISCONTINUED | OUTPATIENT
Start: 2018-01-09 | End: 2018-01-09 | Stop reason: HOSPADM

## 2018-01-09 RX ORDER — MIDAZOLAM HYDROCHLORIDE 1 MG/ML
0.5 INJECTION, SOLUTION INTRAMUSCULAR; INTRAVENOUS
Status: DISCONTINUED | OUTPATIENT
Start: 2018-01-09 | End: 2018-01-09 | Stop reason: HOSPADM

## 2018-01-09 RX ADMIN — SUCCINYLCHOLINE CHLORIDE 140 MG: 20 INJECTION INTRAMUSCULAR; INTRAVENOUS at 08:51

## 2018-01-09 RX ADMIN — ACETAMINOPHEN 650 MG: 325 TABLET, FILM COATED ORAL at 01:04

## 2018-01-09 RX ADMIN — PROPOFOL 150 MG: 10 INJECTION, EMULSION INTRAVENOUS at 08:51

## 2018-01-09 RX ADMIN — SODIUM CHLORIDE 50 ML/HR: 900 INJECTION, SOLUTION INTRAVENOUS at 09:00

## 2018-01-09 RX ADMIN — FENTANYL CITRATE 100 MCG: 50 INJECTION, SOLUTION INTRAMUSCULAR; INTRAVENOUS at 08:51

## 2018-01-09 RX ADMIN — ROCURONIUM BROMIDE 10 MG: 10 INJECTION, SOLUTION INTRAVENOUS at 08:51

## 2018-01-09 RX ADMIN — SODIUM CHLORIDE, SODIUM LACTATE, POTASSIUM CHLORIDE, AND CALCIUM CHLORIDE 125 ML/HR: 600; 310; 30; 20 INJECTION, SOLUTION INTRAVENOUS at 08:39

## 2018-01-09 RX ADMIN — SENNOSIDES 8.6 MG: 8.6 TABLET, FILM COATED ORAL at 22:33

## 2018-01-09 RX ADMIN — CARVEDILOL 25 MG: 12.5 TABLET, FILM COATED ORAL at 17:35

## 2018-01-09 RX ADMIN — Medication 10 ML: at 15:00

## 2018-01-09 RX ADMIN — VANCOMYCIN HYDROCHLORIDE 1000 MG: 10 INJECTION, POWDER, LYOPHILIZED, FOR SOLUTION INTRAVENOUS at 17:38

## 2018-01-09 RX ADMIN — LEVOFLOXACIN 750 MG: 5 INJECTION, SOLUTION INTRAVENOUS at 02:28

## 2018-01-09 RX ADMIN — DOCUSATE SODIUM 100 MG: 100 CAPSULE, LIQUID FILLED ORAL at 17:35

## 2018-01-09 RX ADMIN — Medication 10 ML: at 22:33

## 2018-01-09 RX ADMIN — ROCURONIUM BROMIDE 20 MG: 10 INJECTION, SOLUTION INTRAVENOUS at 09:16

## 2018-01-09 RX ADMIN — LIDOCAINE HYDROCHLORIDE 60 MG: 20 INJECTION, SOLUTION EPIDURAL; INFILTRATION; INTRACAUDAL; PERINEURAL at 08:51

## 2018-01-09 RX ADMIN — OXYCODONE HYDROCHLORIDE AND ACETAMINOPHEN 2 TABLET: 5; 325 TABLET ORAL at 19:51

## 2018-01-09 RX ADMIN — ALBUMIN HUMAN 250 ML: 50 SOLUTION INTRAVENOUS at 11:43

## 2018-01-09 RX ADMIN — Medication: at 15:43

## 2018-01-09 RX ADMIN — PANTOPRAZOLE SODIUM 40 MG: 40 TABLET, DELAYED RELEASE ORAL at 17:35

## 2018-01-09 RX ADMIN — SODIUM CHLORIDE, SODIUM LACTATE, POTASSIUM CHLORIDE, CALCIUM CHLORIDE: 600; 310; 30; 20 INJECTION, SOLUTION INTRAVENOUS at 08:43

## 2018-01-09 RX ADMIN — DILTIAZEM HYDROCHLORIDE 5 MG/HR: 5 INJECTION, SOLUTION INTRAVENOUS at 18:55

## 2018-01-09 RX ADMIN — MIDAZOLAM HYDROCHLORIDE 1 MG: 1 INJECTION, SOLUTION INTRAMUSCULAR; INTRAVENOUS at 08:41

## 2018-01-09 RX ADMIN — Medication: at 12:39

## 2018-01-09 RX ADMIN — SODIUM CHLORIDE 250 ML: 900 INJECTION, SOLUTION INTRAVENOUS at 00:30

## 2018-01-09 RX ADMIN — PIPERACILLIN AND TAZOBACTAM 10.12 G: 3; .375 INJECTION, POWDER, FOR SOLUTION INTRAVENOUS at 17:53

## 2018-01-09 RX ADMIN — ALBUMIN HUMAN 250 ML: 50 SOLUTION INTRAVENOUS at 10:42

## 2018-01-09 NOTE — PERIOP NOTES
TRANSFER - OUT REPORT:    Verbal report given to Brigittejames Vuong Agent  being transferred to [facility] for routine post - op       Report consisted of patients Situation, Background, Assessment and   Recommendations(SBAR). Time Pre op antibiotic given:none  Anesthesia Stop time: 0228  Alvarez Present on Transfer to floor:yes  Order for Alvarez on Chart:yes  Discharge Prescriptions with Chart:no    Information from the following report(s) SBAR, OR Summary, Intake/Output and MAR was reviewed with the receiving nurse. Opportunity for questions and clarification was provided. Is the patient on 02? NO       Is the patient on a monitor? YES    Is the nurse transporting with the patient? YES    Surgical Waiting Area notified of patient's transfer from PACU? YES      The following personal items collected during your admission accompanied patient upon transfer:   Dental Appliance: Dental Appliances: At bedside  Vision: Visual Aid: Glasses, At bedside  Hearing Aid:    Jewelry: Jewelry: Clabe Rafter, With patient  Clothing: Clothing: At bedside  Other Valuables: Other Valuables:  At bedside, Cell Phone, Eyeglasses, Other (comment) (phone )  Valuables sent to safe:

## 2018-01-09 NOTE — ROUTINE PROCESS
Patient: Joyce Mccarty MRN: [id number]  SSN: [id number]   YOB: 1945  Age: 67 y.o. Sex: male     Patient is status post Procedure(s):  LEFT VIDEO ASSISTED THOROCOSCOPY, MINI AXILLARY THOROCOTOMY, FULL PULMONARY DECORTICATION. Surgeon(s) and Role:     * Madeleine Gaming MD - Primary    Local/Dose/Irrigation: 60ML 1:1 MIXTURE OF 1%LIDOCAINE AND 0.25%MARCAINE INJECTED LEFT SURGICAL WOUNDS. Peripheral IV 01/07/18 Right Hand (Active)   Site Assessment Clean, dry, & intact 1/9/2018  4:00 AM   Phlebitis Assessment 0 1/9/2018  4:00 AM   Infiltration Assessment 0 1/9/2018  4:00 AM   Dressing Status Clean, dry, & intact 1/9/2018  4:00 AM   Dressing Type Transparent;Tape 1/9/2018  4:00 AM   Hub Color/Line Status Pink; Infusing;Flushed;Patent 1/9/2018  4:00 AM   Action Taken Open ports on tubing capped 1/9/2018  4:00 AM   Alcohol Cap Used Yes 1/9/2018  4:00 AM       Peripheral IV 01/08/18 Left Wrist (Active)   Site Assessment Clean, dry, & intact 1/9/2018  4:00 AM   Phlebitis Assessment 0 1/9/2018  4:00 AM   Infiltration Assessment 0 1/9/2018  4:00 AM   Dressing Status Clean, dry, & intact 1/9/2018  4:00 AM   Dressing Type Transparent;Tape 1/9/2018  4:00 AM   Hub Color/Line Status Blue;Capped;Flushed;Patent 1/9/2018  4:00 AM   Action Taken Open ports on tubing capped 1/9/2018  4:00 AM   Alcohol Cap Used Yes 1/9/2018  4:00 AM       Peripheral IV 01/09/18 Left Arm (Active)      Arterial Line 01/09/18 Left Radial artery (Active)          Airway - Endotracheal Tube (Active)                   Dressing/Packing:  Wound Flank Left;Upper-DRESSING TYPE: Topical skin adhesive/glue (01/09/18 1115)  Wound Chest Left;Lateral-DRESSING TYPE: Topical skin adhesive/glue (01/09/18 1115)  Wound Chest Left; Lower-DRESSING TYPE: Dry dressing (DRAIN SPONGE, ISLAND DRESSING) (01/09/18 1115)  Splint/Cast:  ]    Other:  SCDs, CANDELARIO, CHEST TUBE

## 2018-01-09 NOTE — BRIEF OP NOTE
BRIEF OPERATIVE NOTE    Date of Procedure: 1/9/2018   Preoperative Diagnosis: EMPYEMA  Postoperative Diagnosis: EMPYEMA    Procedure(s):  LEFT VIDEO ASSISTED THOROCOSCOPY, MINI AXILLARY THOROCOTOMY, FULL PULMONARY DECORTICATION  Surgeon(s) and Role:     * Annamarie Andersen MD - Primary         Assistant Staff:       Surgical Staff:  Circ-1: Cheyenne Del Angel RN  Scrub Tech-Relief: Jayme Fraga  Scrub RN-1: Wiley Palomo RN  Surg Asst-1: Arthur Apley  Float Staff: Chapin Srinivasan RN  Event Time In   Incision Start 9672   Incision Close      Anesthesia: General   Estimated Blood Loss: 300ml  Specimens:   ID Type Source Tests Collected by Time Destination   1 : LEFT PLEURAL PEEL Frozen Section Lung Biopsy  Annamarie Andersen MD 1/9/2018 0944 Pathology   2 : 2ND LEFT PLUERAL PEEL Frozen Section   Annamarie Andersen MD 1/9/2018 0950 Pathology   3 : LEFT PARIETAL PLUERAL PEEL  Frozen Section Lung Biopsy  Annamarie Andersen MD 1/9/2018 1000 Pathology      Findings: thick fibrous parietal pleural.  Extensive fibrinopurulent material   Complications: none  Implants: * No implants in log *     Condition: stable    Disposition: to pacu

## 2018-01-09 NOTE — ANESTHESIA PREPROCEDURE EVALUATION
Anesthetic History   No history of anesthetic complications            Review of Systems / Medical History  Patient summary reviewed, nursing notes reviewed and pertinent labs reviewed    Pulmonary  Within defined limits                 Neuro/Psych   Within defined limits           Cardiovascular  Within defined limits  Hypertension      CHF  Dysrhythmias : atrial fibrillation        Comments: pulm HTN   GI/Hepatic/Renal  Within defined limits              Endo/Other  Within defined limits           Other Findings              Physical Exam    Airway  Mallampati: II  TM Distance: > 6 cm  Neck ROM: normal range of motion   Mouth opening: Normal     Cardiovascular  Regular rate and rhythm,  S1 and S2 normal,  no murmur, click, rub, or gallop             Dental    Dentition: Poor dentition     Pulmonary  Breath sounds clear to auscultation               Abdominal  GI exam deferred       Other Findings            Anesthetic Plan    ASA: 3  Anesthesia type: general    Monitoring Plan: Arterial line      Induction: Intravenous  Anesthetic plan and risks discussed with: Patient

## 2018-01-09 NOTE — ROUTINE PROCESS
Bedside, Verbal and Written shift change report given to Ho Blandon RN (oncoming nurse) by Tejas Mcbride RN (offgoing nurse). Report included the following information SBAR, Kardex, ED Summary, Procedure Summary, Intake/Output, MAR, Accordion and Recent Results.

## 2018-01-09 NOTE — PROGRESS NOTES
Pulmonary / [address] Day: 3     S:    Chest tube placed yesterday with purulent drainge.   Cx growing microaerophillic strep species  Went to OR this am for VATS decortication    Pt is back in ICU  Extubated on NC O2  Alert  No shortness of breath  No distress  Says his pain is well controlled on PCA dilaudid  No pressor medications   O:    Patient Vitals for the past 4 hrs:   BP Temp Pulse Resp SpO2   18 1320 97/82 97.6 °F (36.4 °C) (!) 119 17 97 %   18 1245 120/76 - (!) 116 21 100 %   18 1240 - - (!) 111 20 100 %   18 1230 113/87 - (!) 118 19 100 %   18 1225 114/84 - (!) 118 19 100 %   18 1220 114/74 - (!) 116 18 96 %   18 1219 107/61 97.7 °F (36.5 °C) (!) 112 24 93 %   18 1218 - - (!) 116 13 100 %   18 1217 - - (!) 115 16 -   18 1214 - - (!) 117 - -     Temp (24hrs), Av.1 °F (36.7 °C), Min:97.6 °F (36.4 °C), Max:98.5 °F (36.9 °C)      Intake/Output Summary (Last 24 hours) at 18 1337  Last data filed at 18 1323   Gross per 24 hour   Intake           1181.3 ml   Output             2200 ml   Net          -1018.7 ml     Lab Results   Component Value Date/Time    Glucose (POC) 104 2018 08:20 AM       Exam:  Alert  Well appearing  No resp distress  NC O2  Anicteric  MMM  No accessory use  L chest tube with serosanguenous drainage  RRR  Soft  BS present  Warm and dry  No edema      Imaging: Images independently viewed  CXR - Left chest tubes, residual effusion vs pleural thickening    Labs:  Lab:  Recent Labs      18   0550  18   0334  18   0139   WBC  9.8  9.7   --    HGB  8.2*  6.7*   --    PLT  462*  496*   --    NA  138  138   --    K  3.6  4.2   --    CL  106  108   --    CO2  19*  23   --    BUN  20  15   --    CREA  1.37*  1.00   --    GLU  298*  91   --    CA  7.1*  8.8   --    MG   --   1.9   --    PHOS   --   3.1   --    INR   --   1.5*   --    TROIQ   --    --   <0.04   CPK   --    --   16* TBILI   --   0.8   --    SGOT   --   11*   --    LAC   --   1.4   --      All Micro Results     Procedure Component Value Units Date/Time    CULTURE, BODY FLUID Perla Box STAIN [[id number]]  (Abnormal) Collected:  01/08/18 1557    Order Status:  Completed Specimen:  Pleural Fluid Updated:  01/09/18 1039     Special Requests: NO SPECIAL REQUESTS        GRAM STAIN 4+ WBCS SEEN         NO ORGANISMS SEEN        Culture result:         Culture performed on Unspun Fluid              HEAVY POSSIBLE MICROAEROPHILIC STREPTOCOCCUS (A)            A/P:   Left pleural effusion - empyema  1/9/2018 - Procedure(s):  LEFT VIDEO ASSISTED THOROCOSCOPY, MINI AXILLARY THOROCOTOMY, FULL PULMONARY DECORTICATION   Microaerophillic strep species in fluid    Anemia  S/p transfusion    H/o afib    --on vanc zosyn and levaquin - may be able to de-escalate once cultures and sensitivities are final  --Chest tube an post op care per thoracic surgery  --PCA pain control  --Protonix scd's      Medical Decision Making Today  · I have reviewed the flowsheet and previous days notes  · Acute or chronic illness that poses a threat to life or bodily function  · Pareneteral controlled substances - Adjusted / Fortino Slim / Started  · Review and order of Clinical lab tests  · Review and Order of Radiology tests  · Discuss case with Specialist MD  · Independent visualization of Image     D/w Dr Preeti Tompkins MD

## 2018-01-09 NOTE — PERIOP NOTES
SURGEON ANNOUNCES 2 LAPS IN CHEST WOUND AT 1052 - REMOVED AT 1055. AGAIN ANNOUNCES 2 LAPS IN CHEST WOUND AT 1057 - REMOVED AT 1100.

## 2018-01-09 NOTE — ANESTHESIA PROCEDURE NOTES
Arterial Line Placement    Start time: 1/9/2018 8:35 AM  End time: 1/9/2018 8:39 AM  Performed by: ERIN Dawn  Authorized by: ERIN Dawn     Pre-Procedure  Preanesthetic Checklist: patient identified, risks and benefits discussed, anesthesia consent, site marked, patient being monitored, timeout performed and patient being monitored    Timeout Time: 08:35        Procedure:   Prep:  ChloraPrep  Seldinger Technique?: Yes    Orientation:  Left  Location:  Radial artery  Catheter size:  20 G  Number of attempts:  1  Cont Cardiac Output Sensor: No      Assessment:   Post-procedure:  Line secured and sterile dressing applied  Patient Tolerance:  Patient tolerated the procedure well with no immediate complications

## 2018-01-09 NOTE — PERIOP NOTES
ANN ABSORBABLE HEMOSTATIC PARTICLES LOT #[id number] DELIVERED TO SURGICAL FIELD FOR USE BY PHYSICIAN. -WANDA RN.

## 2018-01-09 NOTE — ROUTINE PROCESS
Bedside, Verbal and Written shift change report given to Emile Banegas RN (oncoming nurse) by Alva Wang RN (offgoing nurse). Report included the following information SBAR, Kardex, ED Summary, Procedure Summary, Intake/Output, MAR, Accordion and Recent Results.

## 2018-01-09 NOTE — CDMP QUERY
There is a dx of Acute on chronic congestive heart failure doc in the [facility]   After further study, can this diagnosis be ruled in or out and if ruled in please document the following in your next PN:       Documentation for heart failure must:      Specify Acuity :   Acute, Chronic, acute on chronic     Identify Type:    Systolic, Diastolic, Combined systolic and diastolic failure      List the relationship of HTN to Heart Failure     Identify the underlying cause     Thank you,    Zora Adams RN, BSN, [facility], [facility]  [phone]

## 2018-01-09 NOTE — PROGRESS NOTES
Day #2 of Vancomycin  Indication:  Left empyema, CAP   - S/P VATS with mini-axillary thoracotomy & full pulmonary decortication on   Current regimen:  1 gm IV Q 16 hr  Abx regimen:  Levaquin + Zosyn + Vancomycin  ID Following ?: NO  Concomitant nephrotoxic drugs (requires more frequent monitoring): None  Frequency of BMP?: Not ordered, last done     Recent Labs      18   0550  18   0334   WBC  9.8  9.7   CREA  1.37*  1.00   BUN  20  15     Est CrCl: ~40-50 ml/min; UO: ~1 ml/kg/hr  Temp (24hrs), Av.1 °F (36.7 °C), Min:97.6 °F (36.4 °C), Max:98.5 °F (36.9 °C)    Cultures:    Pleural fluid: heavy possible microaerophilic Strep, pending    Goal trough = 15 - 20 mcg/mL    Recent trough history (date/time/level/dose/action taken):  None    Plan: Given the patient's worsening Scr (1.37 from 1 yesterday), the dose of vancomycin will be preemptively adjusted to 1 gm IV Q 24 hr.  Pharmacy will continue to monitor patient daily and will make dosage adjustments based upon changing renal function.

## 2018-01-09 NOTE — OP NOTES
[address] [facility]  ACUTE CARE OP NOTE    Lorna Alanis  MR#: [id number]  : 1945  ACCOUNT #: [de-identified]   DATE OF SERVICE: 2018    SURGEON:  Dr. Abebe Bull MD    PROCEDURES PERFORMED:  1. Left video-assisted thoracoscopy. 2.  Mini axillary thoracotomy. 3.  Full pulmonary decortication. PREOPERATIVE DIAGNOSES:  1. Left empyema. 2.  Community acquired pneumonia. 3.  Atrial fibrillation. 4.  Chronic obstructive pulmonary disease. POSTOPERATIVE DIAGNOSES:   1. Left empyema. 2.  Community acquired pneumonia. 3.  Atrial fibrillation. 4.  Chronic obstructive pulmonary disease. SPECIMENS REMOVED:   1. Left pleural peel was sent for frozen section. 2.  Second specimen of left pleural peel was sent for frozen section. 3.  Left visceral pleural peel was sent for frozen section. DRAINS AND TUBES:  Two 32-Cambodian chest tubes were within the left hemithorax. ANESTHESIA:  General with double lumen endotracheal intubation. ESTIMATED BLOOD LOSS:  For this case, was 300 mL. INDICATIONS FOR PROCEDURE:  The patient is a 77-year-old male who just after Luis Alfredo presented to an outside facility with an accumulated left pleural effusion. Thoracentesis was performed. He was discharged home. He then began to feel worse, had increasing dyspnea and again presented to the ER this time with an even larger left pleural effusion and in rapid AFib. He was transferred to [facility] for thoracic surgery evaluation. Of note, in his chart, there had been mention of a left upper lobe lung mass seen on prior chest CT. This was not noted on his current CAT scan. A left chest tube was placed with kvng purulent material evacuated from the chest.  The decision was made to proceed to the operating room for decortication.     PROCEDURE IN DETAIL:  After informed consent was obtained and placed on the chart, the patient was taken to the operating room and placed supine on the table. General anesthesia with double lumen endotracheal intubation was induced without complication. Perioperative antibiotics were administered. Alvarez catheter was placed. Radial art line was placed. The patient was then placed in the right lateral decubitus position with left side up. The patient's left chest was prepped and draped in sterile fashion. A timeout was performed. The previously placed left chest tube had been removed. In the eighth intercostal space and anterior axillary line, a 10 mm Thoracoport was placed. Posteriorly in the sixth intercostal space, a working port was placed and anteriorly in the fourth intercostal space a working port was placed. The chest was full of fibrinopurulent material, most of this was evacuated. The patient was noted to have an extremely thickened parietal and visceral pleura. The lower lobe and part of the upper lobe were entrapped. Multiple biopsies of the parietal and visceral pleural peel were taken and sent to pathology. The frozen section on all biopsies revealed no malignant cells. Thickened fibrous material.    Full pulmonary decortication was then performed of both the parietal and visceral pleural surfaces. The left lower lobe appeared to be fused to the diaphragm and I was unable to free the basilar segments from the diaphragm. After full pulmonary decortication had been performed, the lung was reinflated under direct visualization and this time was able to fully reexpand. Hemostasis was ensured. The chest was irrigated with approximately 2.5 liters of warm saline. Intercostal nerve blocks were then performed. One 32 straight chest tube was placed posteriorly and a 32-Tajik right angle chest tube was placed on the diaphragm. Prior to the end of the procedure, an attempt was made to palpate the entire upper and lower lobe.   The lobes were stiff, thickened, and distorted due to the pathologic process; however, no dominant masses were appreciated. ASSESSMENT AND PLAN:  The vertical mini axillary thoracotomy incision was then closed #2 Vicryl pericostal sutures were used to reapproximate the ribs. An 0 Vicryl suture was used to close the serratus and latissimus fascia, 2-0 Vicryl subcutaneous stitch and 3-0 Vicryl subcuticular stitch and Dermabond were used. The 2 Thoracoport sites were then closed using 0 Vicryl suture for the fascia and muscular layers, a 3-0 Vicryl for the subcutaneous tissue and 4-0 Vicryl subcuticular stitch and Dermabond. A 2-0 nylon was used to close the old chest tube site. The patient was then reversed from general anesthesia, extubated and taken to the PACU in stable condition. All surgical counts correct x2 at the end of the case. COMPLICATIONS:  There were no immediate complications identified during this case. Dr. Meaghan Fernandes was present and scrubbed throughout the entire procedure. MD LIZBETH Traore / [place]  D: 01/09/2018 11:57     T: 01/09/2018 12:59  JOB #: [id number]

## 2018-01-09 NOTE — PROGRESS NOTES
Face to face end of shift report received from TOMEKA Lofton. Rounding completed. Patient observed in bed, appears asleep.    Jaqueline Underwood  6/15/2017  12:14 AM         Problem: Falls - Risk of  Goal: *Absence of Falls  Document Beau Fall Risk and appropriate interventions in the flowsheet.    Outcome: Progressing Towards Goal  Fall Risk Interventions:  Mobility Interventions: Bed/chair exit alarm, Communicate number of staff needed for ambulation/transfer, OT consult for ADLs, Patient to call before getting OOB, Strengthening exercises (ROM-active/passive)         Medication Interventions: Patient to call before getting OOB    Elimination Interventions: Call light in reach, Urinal in reach, Patient to call for help with toileting needs

## 2018-01-09 NOTE — PROGRESS NOTES
Care Management Interventions  Mode of Transport at Discharge: Other (see comment) (private vehicle)  Discharge Durable Medical Equipment: No  Physical Therapy Consult: No  Occupational Therapy Consult: No  Speech Therapy Consult: No  Current Support Network: Lives with Spouse (independent with ADLs)  Confirm Follow Up Transport: Family  Plan discussed with Pt/Family/Caregiver: Yes  Freedom of Choice Offered: Yes  [place] Resource Information Provided?: No    CM reviewed chart and noted that pt was transferred from a [facility]  for Thoracic Surgeon (VATS procedure). Pt was independent with his ADLs and IADLs prior to admission. Pt lives with his wife in the [facility]. Pt received a chest tube yesterday and had VATs procedure this morning. CM will continue to follow for discharge needs.   DENVER HaskinsW, ACM

## 2018-01-09 NOTE — PROGRESS NOTES
Day #2 of Levaquin  Indication:  Left empyema, CAP   - S/P VATS with mini-axillary thoracotomy & full pulmonary decortication on   Current regimen:  750 mg IV Q 24 hr  Abx regimen: Levaquin + Zosyn + Vancomycin  Recent Labs      18   0550  18   0334   WBC  9.8  9.7   CREA  1.37*  1.00   BUN  20  15     Est CrCl: ~40-50 ml/min; UO: ~1 ml/kg/hr  Temp (24hrs), Av.1 °F (36.7 °C), Min:97.6 °F (36.4 °C), Max:98.5 °F (36.9 °C)    Cultures:    Pleural fluid: heavy possible microaerophilic Strep, pending    Plan: Given the patient's worsening Scr, the dose of Levaquin has been adjusted to 750 mg IV Q 48 hr per [facility] P&T Committee Protocol with respect to renal function. Pharmacy will continue to monitor patient daily and will make dosage adjustments based upon changing renal function.

## 2018-01-09 NOTE — ROUTINE PROCESS
TRANSFER - IN REPORT:    Verbal report received from Saad Son RN(name) on Woodward B [place]  being received from [employer]) for routine post - op      Report consisted of patients Situation, Background, Assessment and   Recommendations(SBAR). Information from the following report(s) SBAR, Kardex, ED Summary, OR Summary, Procedure Summary, Intake/Output, MAR, Accordion and Recent Results was reviewed with the receiving nurse. Opportunity for questions and clarification was provided. Assessment completed upon patients arrival to unit and care assumed.

## 2018-01-09 NOTE — PROGRESS NOTES
Hospitalist Progress Note  Katherine Cortes MD  Answering service: [phone] from in house phone  Cell: [phone]      Date of Service:  2018  NAME:  Kellie Freed  :  1945  MRN:  [id number]      Admission Summary:   68 yo male with PMHx of COPD, Hx of Left pleural effusion, Left lung mass, Atrial Fib, HTN, Hx of GI bleed s/p EGD 2017, taken off Asa/coumadin and Naproxen at that time. Pt states he had dark BMs before Luis Alfredo but they have resolved and have not recurred. Pt now admitted for worsening SOB over past 1 week that is worse with exertion. Pt denies any chest pain, fever, chills, cough, n/v, diarrhea, abdominal pain. Pt went to [facility] who transferred patient to [facility] for possible VATS. Interval history / Subjective:     Pt seen and examined  Doing well   No chest pain, sob, nv/d. Assessment & Plan:     Recurrent Large Left Pleural effusion, r/o malignancy  -sec to left empyema  - hx of COPD , Ex-smoker  -s/p chest tube  with purulent drainage  - s/p VATS   -On Vanc/Zosyn/LVQ  -follow cultures  -Appreciate Thoracic surgery/Pulmonary    Hx of Lung Mass  - not seen clearly on CTA chest   - will need repeat CT at some point  - Thoracic surgery following    Atrial Fib with RVR  - improved with Cardizem gtt  - now on Coreg and Dig  - not on Coumadin/ASA since 2017 due to GI Bleed   - Plan to start Eliquis next month    Anemia, appears Chronic  - hb 8.2 at [facility]  - unclear if was lab error  - 2U PRBCs being transfused  - recent GI work up included EGD which did not show active bleeding per patient  - patient is to have repeat EGD and Colon at some point per patient  - plan for EGD in AM, spoke to Dr Dayanara Reece, will hold on to EGD for now,   - PPI BID     Chronic systolic CHF  -not sure if the patient had acute on chronic CHF  -EF 30-35% with NO RWMA. Moderate to severe MR.  Severe left atrium, right atrium dilatation  -Consider cardiology input    Pulmonary hypertension  -Will discuss with pulmonary tomorrow    COPD, chronic  - does not appear to have exacerbation  - d/c steroids  - Nebs PRN     Compressive Atelectasis  - no obvious symptoms of Pneumonia at this time  - empirically on Levaquin, will continue for time being    Code status: FULL  DVT prophylaxis: Brenda Lynch discussed with: Patient/Family, Nurse and Consultant Thoracic surgery  Disposition: Home w/Family     Hospital Problems  Date Reviewed: 1/8/2018          Codes Class Noted POA    * (Principal)Recurrent left pleural effusion ICD-10-CM: J90  ICD-9-CM: 511.9  1/8/2018 Yes                Review of Systems:   A comprehensive review of systems was negative except for that written in the HPI. Vital Signs:    Last 24hrs VS reviewed since prior progress note. Most recent are:  Visit Vitals    BP 97/80    Pulse (!) 126    Temp 97.6 °F (36.4 °C)    Resp 18    Ht 5' 8.11\" (1.73 m)    Wt 65.3 kg (143 lb 15.4 oz)    SpO2 98%    BMI 21.82 kg/m2         Intake/Output Summary (Last 24 hours) at 01/09/18 1640  Last data filed at 01/09/18 1323   Gross per 24 hour   Intake              850 ml   Output             1450 ml   Net             -600 ml        Physical Examination:             Constitutional:  No acute distress, cooperative, pleasant    ENT:  Oral mucous moist,    Resp:  clear on Rt, diminished on left   CV:  Irregular     GI:  Soft, non distended, non tender. normoactive bowel sounds    Musculoskeletal:  No edema, warm, 2+ pulses throughout    Neurologic:  Moves all extremities.   AAOx3, RUE contracture-chronic      Skin:  Good turgor, no rashes or ulcers       Data Review:    Review and/or order of clinical lab test  Review and/or order of tests in the radiology section of CPT  Review and/or order of tests in the medicine section of CPT      Labs:     Recent Labs      01/09/18   0550  01/08/18   0334   WBC  9.8 9. 7   HGB  8.2*  6.7*   HCT  25.4*  22.0*   PLT  462*  496*     Recent Labs      01/09/18   0550  01/08/18   0334   NA  138  138   K  3.6  4.2   CL  106  108   CO2  19*  23   BUN  20  15   CREA  1.37*  1.00   GLU  298*  91   CA  7.1*  8.8   MG   --   1.9   PHOS   --   3.1     Recent Labs      01/08/18   0334   SGOT  11*   ALT  16   AP  265*   TBILI  0.8   TP  7.3   ALB  1.8*   GLOB  5.5*     Recent Labs      01/08/18   0334   INR  1.5*   PTP  14.9*      Recent Labs      01/08/18   0334   TIBC  137*   PSAT  14*   FERR  767*      Lab Results   Component Value Date/Time    Folate 26.6 01/08/2018 03:34 AM      No results for input(s): PH, PCO2, PO2 in the last 72 hours.   Recent Labs      01/08/18   0139   CPK  16*   CKNDX  Cannot be calculated   TROIQ  <0.04     No results found for: CHOL, CHOLX, CHLST, CHOLV, HDL, LDL, LDLC, DLDLP, TGLX, TRIGL, TRIGP, CHHD, CHHDX  Lab Results   Component Value Date/Time    Glucose (POC) 104 01/09/2018 08:20 AM     No results found for: COLOR, APPRN, SPGRU, REFSG, JEANNINE, PROTU, GLUCU, KETU, BILU, UROU, ANKUR, LEUKU, GLUKE, EPSU, BACTU, WBCU, RBCU, CASTS, UCRY      Medications Reviewed:     Current Facility-Administered Medications   Medication Dose Route Frequency    0.9% sodium chloride infusion  50 mL/hr IntraVENous CONTINUOUS    senna (SENOKOT) tablet 8.6 mg  1 Tab Oral QHS    sodium chloride (NS) flush 5-10 mL  5-10 mL IntraVENous Q8H    sodium chloride (NS) flush 5-10 mL  5-10 mL IntraVENous PRN    oxyCODONE-acetaminophen (PERCOCET) 5-325 mg per tablet 2 Tab  2 Tab Oral Q4H PRN    HYDROmorphone (PF) 15 mg/30 ml (DILAUDID) PCA   IntraVENous CONTINUOUS    [START ON 1/11/2018] levoFLOXacin (LEVAQUIN) 750 mg in D5W IVPB  750 mg IntraVENous Q48H    vancomycin (VANCOCIN) 1000 mg in  ml infusion  1,000 mg IntraVENous Q24H    HYDROmorphone (PF) (DILAUDID) 15 mg/30 mL (0.5 mg/mL) infusion        carvedilol (COREG) tablet 25 mg  25 mg Oral BID WITH MEALS    digoxin (LANOXIN) tablet 0.125 mg  125 mcg Oral DAILY    ondansetron (ZOFRAN) injection 4 mg  4 mg IntraVENous Q4H PRN    docusate sodium (COLACE) capsule 100 mg  100 mg Oral BID    albuterol-ipratropium (DUO-NEB) 2.5 MG-0.5 MG/3 ML  3 mL Nebulization Q4H PRN    lactobac ac& pc-s.therm-b.anim (SALLIE Q/RISAQUAD)  1 Cap Oral DAILY    pantoprazole (PROTONIX) tablet 40 mg  40 mg Oral ACB&D    Vancomycin - Pharmacy to Dose   Other Rx Dosing/Monitoring    piperacillin-tazobactam (ZOSYN) 10.125 g in  mL continuous 24 hr infusion  10.125 g IntraVENous Q24H     ______________________________________________________________________  EXPECTED LENGTH OF STAY: 5d 16h  ACTUAL LENGTH OF STAY:          2                 Idalia Connolly MD

## 2018-01-10 ENCOUNTER — APPOINTMENT (OUTPATIENT)
Dept: GENERAL RADIOLOGY | Age: 73
DRG: 164 | End: 2018-01-10
Attending: NURSE PRACTITIONER
Payer: MEDICARE

## 2018-01-10 LAB
ABO + RH BLD: NORMAL
ANION GAP SERPL CALC-SCNC: 9 MMOL/L (ref 5–15)
BASOPHILS # BLD: 0 K/UL (ref 0–0.1)
BASOPHILS NFR BLD: 0 % (ref 0–1)
BLD PROD TYP BPU: NORMAL
BLOOD GROUP ANTIBODIES SERPL: NORMAL
BPU ID: NORMAL
BUN SERPL-MCNC: 32 MG/DL (ref 6–20)
BUN/CREAT SERPL: 15 (ref 12–20)
CALCIUM SERPL-MCNC: 8.4 MG/DL (ref 8.5–10.1)
CHLORIDE SERPL-SCNC: 110 MMOL/L (ref 97–108)
CO2 SERPL-SCNC: 21 MMOL/L (ref 21–32)
CREAT SERPL-MCNC: 2.17 MG/DL (ref 0.7–1.3)
CROSSMATCH RESULT,%XM: NORMAL
EOSINOPHIL # BLD: 0 K/UL (ref 0–0.4)
EOSINOPHIL NFR BLD: 0 % (ref 0–7)
ERYTHROCYTE [DISTWIDTH] IN BLOOD BY AUTOMATED COUNT: 18.7 % (ref 11.5–14.5)
GLUCOSE SERPL-MCNC: 107 MG/DL (ref 65–100)
HCT VFR BLD AUTO: 26 % (ref 36.6–50.3)
HGB BLD-MCNC: 8.1 G/DL (ref 12.1–17)
LYMPHOCYTES # BLD: 1.2 K/UL (ref 0.8–3.5)
LYMPHOCYTES NFR BLD: 7 % (ref 12–49)
MCH RBC QN AUTO: 26.5 PG (ref 26–34)
MCHC RBC AUTO-ENTMCNC: 31.2 G/DL (ref 30–36.5)
MCV RBC AUTO: 85 FL (ref 80–99)
MONOCYTES # BLD: 1.3 K/UL (ref 0–1)
MONOCYTES NFR BLD: 8 % (ref 5–13)
NEUTS SEG # BLD: 14.5 K/UL (ref 1.8–8)
NEUTS SEG NFR BLD: 85 % (ref 32–75)
PLATELET # BLD AUTO: 459 K/UL (ref 150–400)
POTASSIUM SERPL-SCNC: 4.5 MMOL/L (ref 3.5–5.1)
RBC # BLD AUTO: 3.06 M/UL (ref 4.1–5.7)
SODIUM SERPL-SCNC: 140 MMOL/L (ref 136–145)
SPECIMEN EXP DATE BLD: NORMAL
STATUS OF UNIT,%ST: NORMAL
UNIT DIVISION, %UDIV: 0
VANCOMYCIN SERPL-MCNC: 15.5 UG/ML
WBC # BLD AUTO: 17 K/UL (ref 4.1–11.1)

## 2018-01-10 PROCEDURE — 74011250636 HC RX REV CODE- 250/636: Performed by: INTERNAL MEDICINE

## 2018-01-10 PROCEDURE — 85025 COMPLETE CBC W/AUTO DIFF WBC: CPT | Performed by: INTERNAL MEDICINE

## 2018-01-10 PROCEDURE — 80048 BASIC METABOLIC PNL TOTAL CA: CPT | Performed by: HOSPITALIST

## 2018-01-10 PROCEDURE — 74011250637 HC RX REV CODE- 250/637: Performed by: NURSE PRACTITIONER

## 2018-01-10 PROCEDURE — 74011250637 HC RX REV CODE- 250/637: Performed by: THORACIC SURGERY (CARDIOTHORACIC VASCULAR SURGERY)

## 2018-01-10 PROCEDURE — 36415 COLL VENOUS BLD VENIPUNCTURE: CPT | Performed by: HOSPITALIST

## 2018-01-10 PROCEDURE — 71045 X-RAY EXAM CHEST 1 VIEW: CPT

## 2018-01-10 PROCEDURE — 74011250637 HC RX REV CODE- 250/637: Performed by: INTERNAL MEDICINE

## 2018-01-10 PROCEDURE — 77030018836 HC SOL IRR NACL ICUM -A

## 2018-01-10 PROCEDURE — 65610000006 HC RM INTENSIVE CARE

## 2018-01-10 PROCEDURE — 80202 ASSAY OF VANCOMYCIN: CPT | Performed by: HOSPITALIST

## 2018-01-10 RX ORDER — DILTIAZEM HYDROCHLORIDE 30 MG/1
30 TABLET, FILM COATED ORAL
Status: DISCONTINUED | OUTPATIENT
Start: 2018-01-10 | End: 2018-01-10

## 2018-01-10 RX ORDER — VANCOMYCIN/0.9 % SOD CHLORIDE 1 G/100 ML
1000 PLASTIC BAG, INJECTION (ML) INTRAVENOUS ONCE
Status: COMPLETED | OUTPATIENT
Start: 2018-01-10 | End: 2018-01-10

## 2018-01-10 RX ADMIN — CARVEDILOL 25 MG: 12.5 TABLET, FILM COATED ORAL at 16:59

## 2018-01-10 RX ADMIN — CARVEDILOL 25 MG: 12.5 TABLET, FILM COATED ORAL at 08:35

## 2018-01-10 RX ADMIN — Medication 10 ML: at 05:09

## 2018-01-10 RX ADMIN — PANTOPRAZOLE SODIUM 40 MG: 40 TABLET, DELAYED RELEASE ORAL at 06:33

## 2018-01-10 RX ADMIN — DOCUSATE SODIUM 100 MG: 100 CAPSULE, LIQUID FILLED ORAL at 17:00

## 2018-01-10 RX ADMIN — Medication 10 ML: at 14:00

## 2018-01-10 RX ADMIN — VANCOMYCIN HYDROCHLORIDE 1000 MG: 10 INJECTION, POWDER, LYOPHILIZED, FOR SOLUTION INTRAVENOUS at 19:14

## 2018-01-10 RX ADMIN — DOCUSATE SODIUM 100 MG: 100 CAPSULE, LIQUID FILLED ORAL at 08:35

## 2018-01-10 RX ADMIN — DILTIAZEM HYDROCHLORIDE 30 MG: 30 TABLET, FILM COATED ORAL at 10:29

## 2018-01-10 RX ADMIN — Medication 10 ML: at 21:51

## 2018-01-10 RX ADMIN — Medication 1 CAPSULE: at 08:35

## 2018-01-10 RX ADMIN — DIGOXIN 0.12 MG: 125 TABLET ORAL at 08:35

## 2018-01-10 RX ADMIN — SENNOSIDES 8.6 MG: 8.6 TABLET, FILM COATED ORAL at 21:50

## 2018-01-10 RX ADMIN — PIPERACILLIN AND TAZOBACTAM 10.12 G: 3; .375 INJECTION, POWDER, FOR SOLUTION INTRAVENOUS at 17:02

## 2018-01-10 RX ADMIN — SODIUM CHLORIDE 500 ML: 900 INJECTION, SOLUTION INTRAVENOUS at 10:58

## 2018-01-10 RX ADMIN — PANTOPRAZOLE SODIUM 40 MG: 40 TABLET, DELAYED RELEASE ORAL at 17:00

## 2018-01-10 NOTE — PROGRESS NOTES
Day #3 of Vancomycin - Renal Dosing Update  Indication:  Left empyema, CAP   - S/P VATS with mini-axillary thoracotomy & full pulmonary decortication on   Current regimen:  1 gm IV Q 24 hr  Abx regimen:  Levaquin + Zosyn + Vancomycin  ID Following ?: NO  Concomitant nephrotoxic drugs (requires more frequent monitoring): None  Frequency of BMP?: Daily    Recent Labs      01/10/18   0515  18   0550  18   0334   WBC  17.0*  9.8  9.7   CREA  2.17*  1.37*  1.00   BUN  32*  20  15     Est CrCl: ~25-30 ml/min; UO: ~0.5 ml/kg/hr  Temp (24hrs), Av.6 °F (36.4 °C), Min:96.7 °F (35.9 °C), Max:98.5 °F (36.9 °C)    Cultures:    Pleural fluid: heavy possible microaerophilic Strep, pending    Goal trough = 15 - 20 mcg/mL    Recent trough history (date/time/level/dose/action taken):  None    Plan: The patient's Scr continues to worsen today. At this point I will d/c the current maintenance dose and check a 24-hr level at 1700 today. Pharmacy will continue to monitor patient daily and will make dosage adjustments based upon changing renal function.

## 2018-01-10 NOTE — PROGRESS NOTES
[address]- Per Dr. Zabrina Bradley ok to remove philip at this time. 1800- Discontinued philip per policy, despite complete balloon deflation, patient bleeding from urethral opening for a few seconds following discontinuation of philip. [address] with Dr. Zabrina Bradley, updated on patient's current -140s consistently and current BP. Telephone order for cardizem drip received. 7432- Patient alert and oriented, able to turn self-- does not want to be turned with pillows. Patient educated on importance of changing positions for prevention of pressure ulcers. Cardizem drip infusing.

## 2018-01-10 NOTE — CONSULTS
[address]    Rajesh Chavez  MR#: [id number]  : 1945  ACCOUNT #: [de-identified]   DATE OF SERVICE: 01/10/2018    HISTORY OF PRESENT ILLNESS:  This 70-year-old man was admitted to the hospital early morning hours of 2018 with a history of significant COPD, chronic atrial fibrillation, hypertension, congestive heart failure, pulmonary hypertension, lung mass with a pleural effusion recurrently. He had chest tube insertion and continues to drain. Studies have been sent. He is less short of breath. An echo from 2017 shows a left ventricular ejection fraction in the 30% to 35% range without regional wall motion abnormalities and severe biatrial dilatation with moderate to severe mitral regurgitation. We are asked to evaluate in terms of congestive heart failure and heart rate control. He is feeling much better. He is not short of breath lying flat. There is no chest pain, no presyncope or syncope, no sense of palpitations. Of note, hemoglobin was down to 6.7 when he came in. He has been transfused. PAST MEDICAL HISTORY:  A known lung mass without clear etiology found and otherwise the history is as per the HPI. MEDICATIONS:  At presentation, were aspirin 81 mg a day, Naprosyn 500 twice a day, Coreg 25 twice a day, digoxin 0.25 mcg daily. Currently receiving carvedilol 25 twice a day, digoxin 0.125 daily, diltiazem 30 three times a day, off Cardizem drip, Colace 100 twice a day. Levaquin 750 IV q.48,  Protonix 40 mg a day, Zosyn 10.125 every 24 hours, Senokot daily, hydromorphone p.r.n. ALLERGIES:  NONE KNOWN. FAMILY HISTORY:  Noncontributory. SOCIAL HISTORY:  Continued cigarettes. No significant alcohol. REVIEW OF SYSTEMS:  Has been assessed and negative. PHYSICAL EXAMINATION:  GENERAL:  Pleasant, middle-aged man in no acute distress, somewhat thin. VITAL SIGNS:  Blood pressure 98/68, pulse 120 and irregularly irregular. NECK:  At 30 degrees, there is no jugular venous distention. Carotids are full without bruits. HEENT:  Pupils equal, round, reactive to light and accommodation. Extraocular muscles without nystagmus. Sclerae are clear. LUNGS:  Bandages over the left lung without much in the way of airway movement. There are rhonchi and crackles of the right lung. HEART:  Irregularly irregular without murmur. ABDOMEN:  Soft, nontender, without masses or organomegaly. EXTREMITIES:  Without edema. Pulses are intact. SKIN:  Intact. MUSCULOSKELETAL:  No skeletal deformities. NEUROLOGIC:  Nonlateralizing. LABORATORY DATA:  EKG pending. Hemoglobin up to 8.1 with hematocrit 26.0, white count 17,000. Sodium 140, potassium 4.5, chloride 110, CO2 21, glucose 107, creatinine 2.17. No EKG or digoxin levels available. Cytology on the pleural effusion is not yet back. PROBLEMS:    1. Chronic atrial fibrillation with faster heart rates relating to the underlying medical problems. 2.  Dilated cardiomyopathy with moderately severe left ventricular dysfunction, apparently nonischemic. 3.  Lung mass and pleural effusion. 4.  Chronic obstructive pulmonary disease. 5.  Anemia of unclear etiology. PLAN/RECOMMENDATIONS:  He is stable on current regimen, has worsening renal function. Follow digoxin level. Correct metabolic issues as quickly as possible. Does not need IV diltiazem or any particular rate control as long as he stays under 140 and remains asymptomatic with respect to the AFib. We will follow with you.       MD Beverley Flores 112 / RN  D: 01/10/2018 14:46     T: 01/10/2018 15:15  JOB #: [id number]

## 2018-01-10 NOTE — ANESTHESIA POSTPROCEDURE EVALUATION
Post-Anesthesia Evaluation and Assessment    Patient: Nidhi Granado MRN: [id number]  SSN: [id number]    YOB: 1945  Age: 67 y.o. Sex: male       Cardiovascular Function/Vital Signs  Visit Vitals    /68    Pulse (!) 111    Temp 36.3 °C (97.4 °F)    Resp 16    Ht 5' 8.11\" (1.73 m)    Wt 68.5 kg (151 lb 0.2 oz)    SpO2 (!) 88%    BMI 22.89 kg/m2       Patient is status post general anesthesia for Procedure(s):  LEFT VIDEO ASSISTED THOROCOSCOPY, MINI AXILLARY THOROCOTOMY, FULL PULMONARY DECORTICATION. Nausea/Vomiting: None    Postoperative hydration reviewed and adequate. Pain:  Pain Scale 1: Numeric (0 - 10) (01/10/18 0400)  Pain Intensity 1: 0 (01/10/18 0400)   Managed    Neurological Status:   Neuro  Neurologic State: Alert (01/10/18 0000)  Orientation Level: Oriented X4 (01/10/18 0000)  Cognition: Follows commands (01/10/18 0000)  Speech: Clear (01/10/18 0000)  Assessment L Pupil: Brisk;Round (01/10/18 0000)  Size L Pupil (mm): 3 (01/10/18 0000)  Assessment R Pupil: Brisk;Round (01/10/18 0000)  Size R Pupil (mm): 3 (01/10/18 0000)  LUE Motor Response: Purposeful;Spontaneous  (01/10/18 0000)  LLE Motor Response: Purposeful;Spontaneous  (01/10/18 0000)  RUE Motor Response: Purposeful;Spontaneous  (01/10/18 0000)  RLE Motor Response: Purposeful;Spontaneous  (01/10/18 0000)   At baseline    Mental Status and Level of Consciousness: Arousable    Pulmonary Status:   O2 Device: Room air (01/10/18 0400)   Adequate oxygenation and airway patent    Complications related to anesthesia: None    Post-anesthesia assessment completed.  No concerns    Signed By: Clementina Valadez MD     January 10, 2018

## 2018-01-10 NOTE — PROGRESS NOTES
Pharmacist Note - Vancomycin Dosing  Therapy day 3  Indication:Left empyema, CAP   Current regimen: dosing by levels due to worsening renal function, last dose - 1000 mg given 1/9 @ 1738    A Random Level resulted at 15.5 mcg/mL which was obtained ~23.5 hrs post-dose. Goal trough: 15 - 20 mcg/mL     Plan: Will order 1000 mg x 1 dose. Pharmacy will continue to monitor this patient daily for changes in clinical status and renal function.

## 2018-01-10 NOTE — PROGRESS NOTES
[address]  [address] [facility], [address]       GI PROGRESS NOTE  [facility] office  [phone] NP in-hospital cell phone M-F until 4:30  After 5pm or on weekends, please call  for physician on call      NAME: Tree Kothari   :  1945   MRN:  [id number]       Subjective:   Patient feeling well, denies signs of bleeding. No bowel movement, tolerating diet. Objective:     VITALS:   Last 24hrs VS reviewed since prior progress note. Most recent are:  Visit Vitals    BP (!) 81/60 (BP 1 Location: Left arm, BP Patient Position: At rest)    Pulse (!) 126    Temp 97.6 °F (36.4 °C)    Resp 19    Ht 5' 8.11\" (1.73 m)    Wt 68.5 kg (151 lb 0.2 oz)    SpO2 97%    BMI 22.89 kg/m2       PHYSICAL EXAM:  General: Cooperative, no acute distress    Neurologic:  Alert and oriented X 3. HEENT: EOMI, no scleral icterus   Lungs:  CTA bilaterally. No wheezing  Heart:  S1 S2, tachycardic; irregular rhythm, no murmur   Abdomen: Soft, non-distended, no tenderness. +Bowel sounds  Extremities: No edema  Psych:   Good insight. Not anxious or agitated.     Lab Data Reviewed:     Recent Results (from the past 24 hour(s))   METABOLIC PANEL, BASIC    Collection Time: 01/10/18  5:15 AM   Result Value Ref Range    Sodium 140 136 - 145 mmol/L    Potassium 4.5 3.5 - 5.1 mmol/L    Chloride 110 (H) 97 - 108 mmol/L    CO2 21 21 - 32 mmol/L    Anion gap 9 5 - 15 mmol/L    Glucose 107 (H) 65 - 100 mg/dL    BUN 32 (H) 6 - 20 MG/DL    Creatinine 2.17 (H) 0.70 - 1.30 MG/DL    BUN/Creatinine ratio 15 12 - 20      GFR est AA 36 (L) >60 ml/min/1.73m2    GFR est non-AA 30 (L) >60 ml/min/1.73m2    Calcium 8.4 (L) 8.5 - 10.1 MG/DL   CBC WITH AUTOMATED DIFF    Collection Time: 01/10/18  5:15 AM   Result Value Ref Range    WBC 17.0 (H) 4.1 - 11.1 K/uL    RBC 3.06 (L) 4.10 - 5.70 M/uL    HGB 8.1 (L) 12.1 - 17.0 g/dL    HCT 26.0 (L) 36.6 - 50.3 %    MCV 85.0 80.0 - 99.0 FL    MCH 26.5 26.0 - 34.0 PG    MCHC 31.2 30.0 - 36.5 g/dL    RDW 18.7 (H) 11.5 - 14.5 %    PLATELET 613 (H) 365 - 400 K/uL    NEUTROPHILS 85 (H) 32 - 75 %    LYMPHOCYTES 7 (L) 12 - 49 %    MONOCYTES 8 5 - 13 %    EOSINOPHILS 0 0 - 7 %    BASOPHILS 0 0 - 1 %    ABS. NEUTROPHILS 14.5 (H) 1.8 - 8.0 K/UL    ABS. LYMPHOCYTES 1.2 0.8 - 3.5 K/UL    ABS. MONOCYTES 1.3 (H) 0.0 - 1.0 K/UL    ABS. EOSINOPHILS 0.0 0.0 - 0.4 K/UL    ABS. BASOPHILS 0.0 0.0 - 0.1 K/UL            Assessment:   · Anemia: hemoglobin stable status post transfusion; no signs of GI bleeding  · Pleural Effusion/empyema: status post VATS     Patient Active Problem List   Diagnosis Code    Recurrent left pleural effusion J90     Plan:   · On PPI  · Monitor CBC, transfuse as necessary  · No plans for urgent EGD at this time, unless hemoglobin drops or signs of GI bleeding     Signed By: Charanjit Morrison NP     1/10/2018  12:53 PM       GI attending note:    I personally examined the patient. Agree with the note of the NP.     Dr. Juana Leavitt

## 2018-01-10 NOTE — PROGRESS NOTES
Beverley Wade [address] M.D.  [phone]               GASTROENTEROLOGY PROGRESS NOTE        NAME: Carlton Garvin   :  1945   MRN:  [id number]       Subjective:   No BMs since hospitalization. No hematemesis. S/p VATS procedure. A fib. Objective:   VITALS:   Last 24hrs VS reviewed. Most recent are:  Visit Vitals    /77    Pulse (!) 119    Temp 97.4 °F (36.3 °C)    Resp 19    Ht 5' 8.11\" (1.73 m)    Wt 65.3 kg (143 lb 15.4 oz)    SpO2 100%    BMI 21.82 kg/m2       Intake/Output Summary (Last 24 hours) at 18  Last data filed at 18 1600   Gross per 24 hour   Intake              700 ml   Output             1050 ml   Net             -350 ml       PHYSICAL EXAM:  General: Alert, in no acute distress    Lungs:            CTA anteriorly  Heart:  Normal S1, S2    Abdomen: Soft, Non distended, Non tender. Normoactive bowel sounds, no rebound/guarding  MSK:   Poor muscle tone  Skin:   Warm to touch  Psych:   Good insight. Not anxious nor agitated. Lab Data Reviewed:   Recent Labs      18   0550  18   0334   WBC  9.8  9.7   HGB  8.2*  6.7*   HCT  25.4*  22.0*   PLT  462*  496*     Recent Labs      18   0550  18   0334   NA  138  138   K  3.6  4.2   CL  106  108   CO2  19*  23   BUN  20  15   CREA  1.37*  1.00   GLU  298*  91   CA  7.1*  8.8   MG   --   1.9   PHOS   --   3.1     Recent Labs      18   0334   SGOT  11*   AP  265*   TP  7.3   ALB  1.8*   GLOB  5.5*     Recent Labs      18   0334   INR  1.5*   PTP  14.9*      Recent Labs      18   0334   TIBC  137*   PSAT  14*   FERR  767*     Recent Labs      18   0139   CPK  16*   CKMB  <1.0       See Electronic Medical Record for all procedure/radiology reports and details which were not copied into this note but were reviewed prior to the creation of the Plan. Assessment:   · Anemia with no signs of active GIB.  Required blood transfusion. · Empyema  · A fib     Patient Active Problem List   Diagnosis Code    Recurrent left pleural effusion J90       Plan:   · EGD on hold. Will proceed based on clinical course with progressive anemia or signs of active GIB. Discussed with Dr. Brittany Cao. Signed by:  Maximilian Cole MD         1/9/2018  8:14 PM

## 2018-01-10 NOTE — PROGRESS NOTES
0800 Bedside and Verbal shift change report given to Jj Pruitt RN (oncoming nurse) by Letitia Camejo RN (offgoing nurse). Report included the following information SBAR, Kardex, ED Summary, OR Summary, Procedure Summary, Intake/Output, MAR and Recent Results. 0800 Assumed care of pt. Pt A&Ox4, resting comfortably in bed, using incentive spirometer. Pt states pain well controlled with Dilaudid PCA. Chest tubes connected via Y-site to low-wall suction, draining serosanguinous fluid; dressing c/d/i.  0930 Pt up to chair with two assist, resting comfortably. 1040 A-line D/Cd. 1050 Interdisciplinary rounds held, discussed plan of care. Orders received for 500cc NS bolus NOW. 26 Dr. Kai Neumann at bedside to evaluate pt.  200 Dr. Key Jones office, S, notified of pt admission, someone from group will be in to see. 0 Pt assisted back to bed, one assist. Dr. Yasmani Strange in to see. Per Dr. Yasmani Strange- OK with HR in 140s so long as pt asymptomatic. 2000 Bedside and Verbal shift change report given to Elver Villa RN (oncoming nurse) by Jj Pruitt RN (offgoing nurse). Report included the following information SBAR, Kardex, ED Summary, OR Summary, Procedure Summary, Intake/Output, MAR and Recent Results.

## 2018-01-10 NOTE — PROGRESS NOTES
Pulmonary / [place] 47 Day: 4     S:    POD 1 s/p VATS decortication for empyema  Doing well  No shortness of breath  Pain controled on dilaudid pca  Chest tube in place  Afebrile  Has chronic afib - developed RVR over night and was started on cardizem gtt    O:    Patient Vitals for the past 4 hrs:   BP Temp Pulse Resp SpO2 Weight   01/10/18 0900 111/69 - (!) 122 22 97 % -   01/10/18 0837 - - - - - 68.5 kg (151 lb 0.2 oz)   01/10/18 0835 130/68 - (!) 111 - - -   01/10/18 0800 110/83 97.4 °F (36.3 °C) (!) 125 16 97 % -   01/10/18 0700 100/65 - (!) 109 18 97 % -   01/10/18 0600 (!) 89/69 - 96 15 97 % -     Temp (24hrs), Av.6 °F (36.4 °C), Min:96.7 °F (35.9 °C), Max:98.5 °F (36.9 °C)      Intake/Output Summary (Last 24 hours) at 01/10/18 0952  Last data filed at 01/10/18 0900   Gross per 24 hour   Intake          2450.63 ml   Output             1595 ml   Net           855.63 ml     Lab Results   Component Value Date/Time    Glucose (POC) 104 2018 08:20 AM       Exam:  Alert  Well appearing  No resp distress  NC O2  Anicteric  MMM  No accessory use  L chest tube with serosanguenous drainage  RRR  Soft  BS present  Warm and dry  No edema      Imaging: Images independently viewed  CXR - Left chest tubes, residual effusion vs pleural thickening    Labs:  Lab:  Recent Labs      01/10/18   0515  18   0550  18   0334  18   0139   WBC  17.0*  9.8  9.7   --    HGB  8.1*  8.2*  6.7*   --    PLT  459*  462*  496*   --    NA  140  138  138   --    K  4.5  3.6  4.2   --    CL  110*  106  108   --    CO2  21  19*  23   --    BUN  32*  20  15   --    CREA  2.17*  1.37*  1.00   --    GLU  107*  298*  91   --    CA  8.4*  7.1*  8.8   --    MG   --    --   1.9   --    PHOS   --    --   3.1   --    INR   --    --   1.5*   --    TROIQ   --    --    --   <0.04   CPK   --    --    --   16*   TBILI   --    --   0.8   --    SGOT   --    --   11*   --    LAC   --    --   1.4   --      All Micro Results Procedure Component Value Units Date/Time    CULTURE, BODY FLUID Sh[facility] November STAIN [[id number]]  (Abnormal) Collected:  01/08/18 9464    Order Status:  Completed Specimen:  Pleural Fluid Updated:  01/09/18 1039     Special Requests: NO SPECIAL REQUESTS        GRAM STAIN 4+ WBCS SEEN         NO ORGANISMS SEEN        Culture result:         Culture performed on Unspun Fluid              HEAVY POSSIBLE MICROAEROPHILIC STREPTOCOCCUS (A)            A/P:   Left pleural effusion - empyema  1/9/2018 - Procedure(s):  LEFT VIDEO ASSISTED THOROCOSCOPY, MINI AXILLARY THOROCOTOMY, FULL PULMONARY DECORTICATION   Microaerophillic strep species in fluid    Anemia  S/p transfusion    H/o afib    --on vanc zosyn and levaquin - may be able to de-escalate once cultures and sensitivities are final  --Chest tube an post op care per thoracic surgery  --PCA pain control  --Protonix scd's  --add po cardizem and wean off cardizem gtt as able        Alpesh Vaughan MD

## 2018-01-10 NOTE — PROGRESS NOTES
Thoracic Surgery Simple Progress Note    Admit Date: 2018  POD: 1 Day Post-Op      Procedure:  Procedure(s):  LEFT VIDEO ASSISTED THOROCOSCOPY, MINI AXILLARY THOROCOTOMY, FULL PULMONARY DECORTICATION      Subjective:     Patient has complaints: No significant medical complaints    Review of Systems:    CARDIAC: negative  RESP: positive for pleural effusion/empyema  NEURO:  negative  INCISION: Clean, dry, and intact  EXT: Denies new swelling or pain in the legs or calves. Objective:     Blood pressure 111/69, pulse (!) 122, temperature 97.4 °F (36.3 °C), resp. rate 22, height 5' 8.11\" (1.73 m), weight 151 lb 0.2 oz (68.5 kg), SpO2 97 %. Temp (24hrs), Av.6 °F (36.4 °C), Min:96.7 °F (35.9 °C), Max:98.5 °F (36.9 °C)        Hemodynamics    PAP    CO    CI    01/10 07 - 01/10 1900  In: 105 [I.V.:105]  Out: 140 [Urine:100]   190 - 01/10 0700  In: 2995.6 [P.O.:480; I.V.:2515.6]  Out: 7097 [Urine:1030]    EXAM:  GENERAL: VSS, afrible, alert and cooperative  HEART:  regular rate and rhythm, S1, S2 normal, no murmur, click, rub or gallop. Noted started on Cardizem last evening for rate control  LUNG: clear to auscultation bilaterally, left sided chest tube in place, drained 370 ml, small intermittent air leak  NEURO:  normal without focal findings  mental status, speech normal, alert and oriented x iii  INCISION: Clean, dry, and intact  EXTREMITIES:No evidence of DVT seen on physical exam.  GI/: Abd soft, nonterder with + bowel sounds.  Voiding    Labs:  Recent Results (from the past 24 hour(s))   POC HEMOGLOBIN    Collection Time: 18 10:28 AM   Result Value Ref Range    Hemoglobin (POC) 9.3 (L) 12.1 - 17.0 g/dL   POC HEMOGLOBIN    Collection Time: 18 10:30 AM   Result Value Ref Range    Hemoglobin (POC) 9.3 (L) 12.1 - 17.0 g/dL   POC HEMOGLOBIN    Collection Time: 18 10:32 AM   Result Value Ref Range    Hemoglobin (POC) 9.7 (L) 12.1 - 74.0 g/dL   METABOLIC PANEL, BASIC    Collection Time: 01/10/18  5:15 AM   Result Value Ref Range    Sodium 140 136 - 145 mmol/L    Potassium 4.5 3.5 - 5.1 mmol/L    Chloride 110 (H) 97 - 108 mmol/L    CO2 21 21 - 32 mmol/L    Anion gap 9 5 - 15 mmol/L    Glucose 107 (H) 65 - 100 mg/dL    BUN 32 (H) 6 - 20 MG/DL    Creatinine 2.17 (H) 0.70 - 1.30 MG/DL    BUN/Creatinine ratio 15 12 - 20      GFR est AA 36 (L) >60 ml/min/1.73m2    GFR est non-AA 30 (L) >60 ml/min/1.73m2    Calcium 8.4 (L) 8.5 - 10.1 MG/DL   CBC WITH AUTOMATED DIFF    Collection Time: 01/10/18  5:15 AM   Result Value Ref Range    WBC 17.0 (H) 4.1 - 11.1 K/uL    RBC 3.06 (L) 4.10 - 5.70 M/uL    HGB 8.1 (L) 12.1 - 17.0 g/dL    HCT 26.0 (L) 36.6 - 50.3 %    MCV 85.0 80.0 - 99.0 FL    MCH 26.5 26.0 - 34.0 PG    MCHC 31.2 30.0 - 36.5 g/dL    RDW 18.7 (H) 11.5 - 14.5 %    PLATELET 312 (H) 402 - 400 K/uL    NEUTROPHILS 85 (H) 32 - 75 %    LYMPHOCYTES 7 (L) 12 - 49 %    MONOCYTES 8 5 - 13 %    EOSINOPHILS 0 0 - 7 %    BASOPHILS 0 0 - 1 %    ABS. NEUTROPHILS 14.5 (H) 1.8 - 8.0 K/UL    ABS. LYMPHOCYTES 1.2 0.8 - 3.5 K/UL    ABS. MONOCYTES 1.3 (H) 0.0 - 1.0 K/UL    ABS. EOSINOPHILS 0.0 0.0 - 0.4 K/UL    ABS. BASOPHILS 0.0 0.0 - 0.1 K/UL       Assessment:   No evidence of DVT.   Principal Problem:    Recurrent left pleural effusion (1/8/2018)      Plan/Recommendations:   Leave chest tube to suction today  D/C tamera  D/C A-line  See orders    Signed By: Kenny MERAZ

## 2018-01-11 ENCOUNTER — APPOINTMENT (OUTPATIENT)
Dept: GENERAL RADIOLOGY | Age: 73
DRG: 164 | End: 2018-01-11
Attending: NURSE PRACTITIONER
Payer: MEDICARE

## 2018-01-11 LAB
ALBUMIN SERPL-MCNC: 1.9 G/DL (ref 3.5–5)
ALBUMIN/GLOB SERPL: 0.4 {RATIO} (ref 1.1–2.2)
ALP SERPL-CCNC: 150 U/L (ref 45–117)
ALT SERPL-CCNC: 16 U/L (ref 12–78)
ANION GAP SERPL CALC-SCNC: 9 MMOL/L (ref 5–15)
AST SERPL-CCNC: 8 U/L (ref 15–37)
ATRIAL RATE: 127 BPM
BACTERIA SPEC CULT: ABNORMAL
BASOPHILS # BLD: 0 K/UL (ref 0–0.1)
BASOPHILS NFR BLD: 0 % (ref 0–1)
BILIRUB SERPL-MCNC: 0.6 MG/DL (ref 0.2–1)
BUN SERPL-MCNC: 36 MG/DL (ref 6–20)
BUN/CREAT SERPL: 15 (ref 12–20)
CALCIUM SERPL-MCNC: 8.2 MG/DL (ref 8.5–10.1)
CALCULATED R AXIS, ECG10: -35 DEGREES
CALCULATED T AXIS, ECG11: -12 DEGREES
CHLORIDE SERPL-SCNC: 109 MMOL/L (ref 97–108)
CO2 SERPL-SCNC: 22 MMOL/L (ref 21–32)
CREAT SERPL-MCNC: 2.42 MG/DL (ref 0.7–1.3)
DIAGNOSIS, 93000: NORMAL
DIGOXIN SERPL-MCNC: 0.7 NG/ML (ref 0.9–2)
EOSINOPHIL # BLD: 0 K/UL (ref 0–0.4)
EOSINOPHIL NFR BLD: 0 % (ref 0–7)
ERYTHROCYTE [DISTWIDTH] IN BLOOD BY AUTOMATED COUNT: 18.8 % (ref 11.5–14.5)
GLOBULIN SER CALC-MCNC: 4.7 G/DL (ref 2–4)
GLUCOSE SERPL-MCNC: 106 MG/DL (ref 65–100)
GRAM STN SPEC: ABNORMAL
GRAM STN SPEC: ABNORMAL
HCT VFR BLD AUTO: 28.3 % (ref 36.6–50.3)
HGB BLD-MCNC: 8.8 G/DL (ref 12.1–17)
LYMPHOCYTES # BLD: 1.5 K/UL (ref 0.8–3.5)
LYMPHOCYTES NFR BLD: 10 % (ref 12–49)
MAGNESIUM SERPL-MCNC: 1.9 MG/DL (ref 1.6–2.4)
MCH RBC QN AUTO: 26.7 PG (ref 26–34)
MCHC RBC AUTO-ENTMCNC: 31.1 G/DL (ref 30–36.5)
MCV RBC AUTO: 85.8 FL (ref 80–99)
MONOCYTES # BLD: 1.5 K/UL (ref 0–1)
MONOCYTES NFR BLD: 10 % (ref 5–13)
NEUTS SEG # BLD: 12.1 K/UL (ref 1.8–8)
NEUTS SEG NFR BLD: 80 % (ref 32–75)
PHOSPHATE SERPL-MCNC: 4 MG/DL (ref 2.6–4.7)
PLATELET # BLD AUTO: 436 K/UL (ref 150–400)
POTASSIUM SERPL-SCNC: 4.4 MMOL/L (ref 3.5–5.1)
PROT SERPL-MCNC: 6.6 G/DL (ref 6.4–8.2)
Q-T INTERVAL, ECG07: 262 MS
QRS DURATION, ECG06: 82 MS
QTC CALCULATION (BEZET), ECG08: 383 MS
RBC # BLD AUTO: 3.3 M/UL (ref 4.1–5.7)
SERVICE CMNT-IMP: ABNORMAL
SODIUM SERPL-SCNC: 140 MMOL/L (ref 136–145)
VANCOMYCIN SERPL-MCNC: 18.8 UG/ML
VENTRICULAR RATE, ECG03: 129 BPM
WBC # BLD AUTO: 15.2 K/UL (ref 4.1–11.1)

## 2018-01-11 PROCEDURE — 74011250637 HC RX REV CODE- 250/637: Performed by: NURSE PRACTITIONER

## 2018-01-11 PROCEDURE — 80162 ASSAY OF DIGOXIN TOTAL: CPT | Performed by: INTERNAL MEDICINE

## 2018-01-11 PROCEDURE — 97161 PT EVAL LOW COMPLEX 20 MIN: CPT

## 2018-01-11 PROCEDURE — 84100 ASSAY OF PHOSPHORUS: CPT | Performed by: INTERNAL MEDICINE

## 2018-01-11 PROCEDURE — 83735 ASSAY OF MAGNESIUM: CPT | Performed by: INTERNAL MEDICINE

## 2018-01-11 PROCEDURE — 74011250636 HC RX REV CODE- 250/636: Performed by: INTERNAL MEDICINE

## 2018-01-11 PROCEDURE — 74011250637 HC RX REV CODE- 250/637: Performed by: THORACIC SURGERY (CARDIOTHORACIC VASCULAR SURGERY)

## 2018-01-11 PROCEDURE — 80053 COMPREHEN METABOLIC PANEL: CPT | Performed by: INTERNAL MEDICINE

## 2018-01-11 PROCEDURE — 36415 COLL VENOUS BLD VENIPUNCTURE: CPT | Performed by: INTERNAL MEDICINE

## 2018-01-11 PROCEDURE — 71045 X-RAY EXAM CHEST 1 VIEW: CPT

## 2018-01-11 PROCEDURE — 85025 COMPLETE CBC W/AUTO DIFF WBC: CPT | Performed by: INTERNAL MEDICINE

## 2018-01-11 PROCEDURE — 74011250636 HC RX REV CODE- 250/636: Performed by: HOSPITALIST

## 2018-01-11 PROCEDURE — 93005 ELECTROCARDIOGRAM TRACING: CPT

## 2018-01-11 PROCEDURE — 65610000006 HC RM INTENSIVE CARE

## 2018-01-11 PROCEDURE — 97530 THERAPEUTIC ACTIVITIES: CPT

## 2018-01-11 PROCEDURE — 80202 ASSAY OF VANCOMYCIN: CPT | Performed by: HOSPITALIST

## 2018-01-11 PROCEDURE — 74011250637 HC RX REV CODE- 250/637: Performed by: INTERNAL MEDICINE

## 2018-01-11 RX ORDER — SODIUM CHLORIDE 9 MG/ML
75 INJECTION, SOLUTION INTRAVENOUS CONTINUOUS
Status: DISCONTINUED | OUTPATIENT
Start: 2018-01-11 | End: 2018-01-12

## 2018-01-11 RX ADMIN — SODIUM CHLORIDE 75 ML/HR: 900 INJECTION, SOLUTION INTRAVENOUS at 09:42

## 2018-01-11 RX ADMIN — DOCUSATE SODIUM 100 MG: 100 CAPSULE, LIQUID FILLED ORAL at 18:00

## 2018-01-11 RX ADMIN — PIPERACILLIN AND TAZOBACTAM 10.12 G: 3; .375 INJECTION, POWDER, FOR SOLUTION INTRAVENOUS at 17:42

## 2018-01-11 RX ADMIN — Medication 1 CAPSULE: at 08:37

## 2018-01-11 RX ADMIN — SENNOSIDES 8.6 MG: 8.6 TABLET, FILM COATED ORAL at 21:01

## 2018-01-11 RX ADMIN — Medication 10 ML: at 21:01

## 2018-01-11 RX ADMIN — CARVEDILOL 25 MG: 12.5 TABLET, FILM COATED ORAL at 16:50

## 2018-01-11 RX ADMIN — DIGOXIN 0.12 MG: 125 TABLET ORAL at 08:37

## 2018-01-11 RX ADMIN — SODIUM CHLORIDE 75 ML/HR: 900 INJECTION, SOLUTION INTRAVENOUS at 23:20

## 2018-01-11 RX ADMIN — DOCUSATE SODIUM 100 MG: 100 CAPSULE, LIQUID FILLED ORAL at 08:37

## 2018-01-11 RX ADMIN — Medication 10 ML: at 14:00

## 2018-01-11 RX ADMIN — LEVOFLOXACIN 750 MG: 5 INJECTION, SOLUTION INTRAVENOUS at 02:27

## 2018-01-11 RX ADMIN — PANTOPRAZOLE SODIUM 40 MG: 40 TABLET, DELAYED RELEASE ORAL at 16:49

## 2018-01-11 RX ADMIN — Medication 10 ML: at 06:49

## 2018-01-11 RX ADMIN — PANTOPRAZOLE SODIUM 40 MG: 40 TABLET, DELAYED RELEASE ORAL at 08:37

## 2018-01-11 RX ADMIN — CARVEDILOL 25 MG: 12.5 TABLET, FILM COATED ORAL at 08:37

## 2018-01-11 NOTE — PROGRESS NOTES
Thoracic Surgery Simple Progress Note    Admit Date: 2018  POD: 2 Days Post-Op      Procedure:  Procedure(s):  LEFT VIDEO ASSISTED THOROCOSCOPY, MINI AXILLARY THOROCOTOMY, FULL PULMONARY DECORTICATION      Subjective:     Patient has complaints: No significant medical complaints    Review of Systems:    CARDIAC: negative  RESP: positive for pleural effusion  NEURO:  negative  INCISION: Clean, dry, and intact  EXT: Denies new swelling or pain in the legs or calves. Objective:     Blood pressure 98/66, pulse (!) 132, temperature 97.4 °F (36.3 °C), resp. rate (!) 31, height 5' 8.11\" (1.73 m), weight 153 lb 9.6 oz (69.7 kg), SpO2 97 %. Temp (24hrs), Av.7 °F (36.5 °C), Min:97.4 °F (36.3 °C), Max:97.9 °F (36.6 °C)        Hemodynamics    PAP    CO    CI     07 -  1900  In: 97.5 [I.V.:97.5]  Out: 90 [Urine:50]   190 -  0700  In: 2558.6 [P.O.:480; I.V.:2078.6]  Out: 1740 [Urine:1400]    EXAM:  GENERAL: VSS, afrible, alert and cooperative  HEART:  regularly irregular rhythm, A-fib with rate in 140-150s  LUNG: clear to auscultation bilaterally, left chest tube drained 370 ml, no air leak, CXR reviewed  NEURO:  normal without focal findings  mental status, speech normal, alert and oriented x iii  INCISION: Clean, dry, and intact  EXTREMITIES:No evidence of DVT seen on physical exam.  GI/: Abd soft, nonterder with + bowel sounds.  Voiding    Labs:  Recent Results (from the past 24 hour(s))   VANCOMYCIN, RANDOM    Collection Time: 01/10/18  4:56 PM   Result Value Ref Range    Vancomycin, random 15.5 UG/ML   CBC WITH AUTOMATED DIFF    Collection Time: 18  2:41 AM   Result Value Ref Range    WBC 15.2 (H) 4.1 - 11.1 K/uL    RBC 3.30 (L) 4.10 - 5.70 M/uL    HGB 8.8 (L) 12.1 - 17.0 g/dL    HCT 28.3 (L) 36.6 - 50.3 %    MCV 85.8 80.0 - 99.0 FL    MCH 26.7 26.0 - 34.0 PG    MCHC 31.1 30.0 - 36.5 g/dL    RDW 18.8 (H) 11.5 - 14.5 %    PLATELET 556 (H) 536 - 400 K/uL    NEUTROPHILS 80 (H) 32 - 75 % LYMPHOCYTES 10 (L) 12 - 49 %    MONOCYTES 10 5 - 13 %    EOSINOPHILS 0 0 - 7 %    BASOPHILS 0 0 - 1 %    ABS. NEUTROPHILS 12.1 (H) 1.8 - 8.0 K/UL    ABS. LYMPHOCYTES 1.5 0.8 - 3.5 K/UL    ABS. MONOCYTES 1.5 (H) 0.0 - 1.0 K/UL    ABS. EOSINOPHILS 0.0 0.0 - 0.4 K/UL    ABS. BASOPHILS 0.0 0.0 - 0.1 K/UL   MAGNESIUM    Collection Time: 01/11/18  2:41 AM   Result Value Ref Range    Magnesium 1.9 1.6 - 2.4 mg/dL   METABOLIC PANEL, COMPREHENSIVE    Collection Time: 01/11/18  2:41 AM   Result Value Ref Range    Sodium 140 136 - 145 mmol/L    Potassium 4.4 3.5 - 5.1 mmol/L    Chloride 109 (H) 97 - 108 mmol/L    CO2 22 21 - 32 mmol/L    Anion gap 9 5 - 15 mmol/L    Glucose 106 (H) 65 - 100 mg/dL    BUN 36 (H) 6 - 20 MG/DL    Creatinine 2.42 (H) 0.70 - 1.30 MG/DL    BUN/Creatinine ratio 15 12 - 20      GFR est AA 32 (L) >60 ml/min/1.73m2    GFR est non-AA 26 (L) >60 ml/min/1.73m2    Calcium 8.2 (L) 8.5 - 10.1 MG/DL    Bilirubin, total 0.6 0.2 - 1.0 MG/DL    ALT (SGPT) 16 12 - 78 U/L    AST (SGOT) 8 (L) 15 - 37 U/L    Alk.  phosphatase 150 (H) 45 - 117 U/L    Protein, total 6.6 6.4 - 8.2 g/dL    Albumin 1.9 (L) 3.5 - 5.0 g/dL    Globulin 4.7 (H) 2.0 - 4.0 g/dL    A-G Ratio 0.4 (L) 1.1 - 2.2     PHOSPHORUS    Collection Time: 01/11/18  2:41 AM   Result Value Ref Range    Phosphorus 4.0 2.6 - 4.7 MG/DL   EKG, 12 LEAD, INITIAL    Collection Time: 01/11/18  6:57 AM   Result Value Ref Range    Ventricular Rate 129 BPM    Atrial Rate 127 BPM    QRS Duration 82 ms    Q-T Interval 262 ms    QTC Calculation (Bezet) 383 ms    Calculated R Axis -35 degrees    Calculated T Axis -12 degrees    Diagnosis       Atrial fibrillation with rapid ventricular response  Left axis deviation  Low voltage QRS  No previous ECGs available  Confirmed by Lisa Lainez M.D., Etelvina Griffin ([id number]) on 1/11/2018 10:41:34 AM     DIGOXIN    Collection Time: 01/11/18  9:30 AM   Result Value Ref Range    Digoxin level 0.7 (L) 0.9 - 2.0 NG/ML       Assessment:   No evidence of DVT.   Principal Problem:    Recurrent left pleural effusion (1/8/2018)      PATH: Fibrous pleura with marked acute and chronic inflammation and granulation like tissue                                 Plan/Recommendations:   Chest tube gravity today  Ambulate toady    See orders    Signed By: Rosmery MERAZ

## 2018-01-11 NOTE — PROGRESS NOTES
Problem: Mobility Impaired (Adult and Pediatric)  Goal: *Acute Goals and Plan of Care (Insert Text)  Physical Therapy Goals  Initiated 1/11/2018  1. Patient will move from supine to sit and sit to supine  and scoot up and down in bed with supervision/set-up within 7 day(s). 2.  Patient will transfer from bed to chair and chair to bed with supervision/set-up using the least restrictive device within 7 day(s). 3.  Patient will perform sit to stand with supervision/set-up within 7 day(s). 4.  Patient will ambulate with supervision/set-up for 400 feet with the least restrictive device within 7 day(s). 5.  Patient will ascend/descend 16 stairs with L or R handrail with supervision/set-up within 7 day(s). Outcome: Progressing Towards Goal  physical Therapy EVALUATION  Patient: Cheyenne Han (67 y.o. male)  Date: 1/11/2018  Primary Diagnosis: SOB/recurrent pleural effusion/lung mass  Recurrent left pleural effusion  UNKNOWN  Melena  Procedure(s) (LRB):  LEFT VIDEO ASSISTED THOROCOSCOPY, MINI AXILLARY THOROCOTOMY, FULL PULMONARY DECORTICATION (Left) 2 Days Post-Op   Precautions:Fall      ASSESSMENT :  Based on the objective data described below, the patient is close to their baseline with mobility and ADLs. He required SBA for sit<>stand from bedside chair and ambulated 200 feet around unit with SBA and gait belt. Pt had a quick pace, fair plus dynamic standing balance, and was able to perform head turns while ambulating. Recommend home with no further skilled PT needs pending stair training at next session. Pt was left in bedside chair with all needs met. RN notified. Patient will benefit from skilled intervention to address the above impairments.   Patients rehabilitation potential is considered to be Excellent  Factors which may influence rehabilitation potential include:   []         None noted  []         Mental ability/status  [x]         Medical condition  []         Home/family situation and support systems  [x]         Safety awareness  []         Pain tolerance/management  []         Other:      PLAN :  Recommendations and Planned Interventions:  [x]           Bed Mobility Training             []    Neuromuscular Re-Education  [x]           Transfer Training                   []    Orthotic/Prosthetic Training  [x]           Gait Training                         []    Modalities  [x]           Therapeutic Exercises           []    Edema Management/Control  [x]           Therapeutic Activities            [x]    Patient and Family Training/Education  []           Other (comment):    Frequency/Duration: Patient will be followed by physical therapy 3 times a week to address goals. Discharge Recommendations: None  Further Equipment Recommendations for Discharge: tbd     SUBJECTIVE:   Patient stated [birth year] my [birth year]! Red Stable    OBJECTIVE DATA SUMMARY:   HISTORY:    No past medical history on file. No past surgical history on file. Prior Level of Function/Home Situation: Pt is at their baseline  Personal factors and/or comorbidities impacting plan of care:     Home Situation  Home Environment: Private residence  # Steps to Enter: 2  Rails to Enter: Yes  Hand Rails : Bilateral  One/Two Story Residence: Two story  # of Interior Steps: 12  Interior Rails: Both  Living Alone: No  Support Systems: Family member(s), Spouse/Significant Other/Partner  Patient Expects to be Discharged to[de-identified] Private residence  Current DME Used/Available at Home: None  Tub or Shower Type: Shower    EXAMINATION/PRESENTATION/DECISION MAKING:   Critical Behavior:  Neurologic State: Alert  Orientation Level: Oriented X4  Cognition: Appropriate decision making, Follows commands  Safety/Judgement: Awareness of environment  Hearing:   Auditory  Auditory Impairment: None    Range Of Motion:  AROM: Generally decreased, functional (RUE contracture)           PROM: Generally decreased, functional (RUE contracture)           Strength:    Strength: Within functional limits (Except for RUE: 4/5)                    Tone & Sensation:   Tone: Abnormal (In RUE)              Sensation: Intact               Coordination:  Coordination: Within functional limits  Vision:      Functional Mobility:  Bed Mobility:              Transfers:  Sit to Stand: Stand-by asssistance  Stand to Sit: Stand-by asssistance                       Balance:   Sitting: Intact  Standing: Impaired  Standing - Static: Good  Standing - Dynamic : Fair (Plus)  Ambulation/Gait Training:  Distance (ft): 200 Feet (ft)  Assistive Device: Gait belt  Ambulation - Level of Assistance: Stand-by asssistance     Gait Description (WDL): Within defined limits           Base of Support: Narrowed     Speed/Sonja: Accelerated  Step Length: Left shortened;Right shortened      Stairs: To be assessed at next session            Functional Gait Assessment:    Gait Level Surface: Normal  Change In Gait Speed: Normal  Gait With Horizontal Head Turns: Normal  Gait With Vertical Head Turns: Normal  Gait And Pivot Turn: Mild impairment  Step Over Obstacle: Mild impairment  Gait With Narrow Base Of Support: Mild impairment  Gait With Eyes Closed: Mild impairment  Ambulating Backwards: Mild impairment  Steps: Mild impairment  Score: 24     Functional Gait Assessment and G-code impairment scale:  Percentage of Impairment CH    0%   CI    1-19% CJ    20-39% CK    40-59% CL    60-79% CM    80-99% CN     100%   Functional Gait  Score 0-30 30 25-29 19-24 13-18 7-12 1-6 0       Maximum Score 30   £ 22/30 classifies fall risk in older adults and predicts unexplained falls in community-dwelling older adults  Age: \"Normal\" score:   Up to 60 >27/30   60-80 >24/30   Over [de-identified] >19/30   ROVERTO Joya. \"Functional Gait Assessment: concurrent discriminative, and predictive validity in community-dwelling older adults. \" Physical Therapy 90 5 2010. G codes:   In compliance with CMSs Claims Based Outcome Reporting, the following G-code set was chosen for this patient based on their primary functional limitation being treated: The outcome measure chosen to determine the severity of the functional limitation was the FGA  with a score of 24/30 which was correlated with the impairment scale. ? Mobility - Walking and Moving Around:     - CURRENT STATUS: CJ - 20%-39% impaired, limited or restricted    - GOAL STATUS: CI - 1%-19% impaired, limited or restricted    - D/C STATUS:  ---------------To be determined---------------      Physical Therapy Evaluation Charge Determination   History Examination Presentation Decision-Making   MEDIUM  Complexity : 1-2 comorbidities / personal factors will impact the outcome/ POC  LOW Complexity : 1-2 Standardized tests and measures addressing body structure, function, activity limitation and / or participation in recreation  LOW Complexity : Stable, uncomplicated  LOW Complexity : FOTO score of       Based on the above components, the patient evaluation is determined to be of the following complexity level: LOW     Pain:  Pain Scale 1: Numeric (0 - 10)  Pain Intensity 1: 0              Activity Tolerance:   VSS. Tachycardia noted (130's). RN aware. Please refer to the flowsheet for vital signs taken during this treatment. After treatment:   [x]         Patient left in no apparent distress sitting up in chair  []         Patient left in no apparent distress in bed  [x]         Call bell left within reach  [x]         Nursing notified  []         Caregiver present  []         Bed alarm activated    COMMUNICATION/EDUCATION:   The patients plan of care was discussed with: Physical Therapist and Registered Nurse. [x]         Fall prevention education was provided and the patient/caregiver indicated understanding. [x]         Patient/family have participated as able in goal setting and plan of care. [x]         Patient/family agree to work toward stated goals and plan of care.   [] Patient understands intent and goals of therapy, but is neutral about his/her participation. []         Patient is unable to participate in goal setting and plan of care.     Thank you for this referral.  Casi Casey   Time Calculation: 19 mins

## 2018-01-11 NOTE — PROGRESS NOTES
Day #4 of Vancomycin  Indication:  Left empyema, CAP   - S/P VATS with mini-axillary thoracotomy & full pulmonary decortication on   Current regimen:  Based on levels, last dose: 1 gm IV on 1/10 @ 1914  Abx regimen:  Levaquin + Zosyn + Vancomycin  ID Following ?: NO  Concomitant nephrotoxic drugs (requires more frequent monitoring): None  Frequency of BMP?: Daily    Recent Labs      18   0241  01/10/18   0515  18   0550   WBC  15.2*  17.0*  9.8   CREA  2.42*  2.17*  1.37*   BUN  36*  32*  20     Est CrCl: ~20-30 ml/min; UO: ~0.6 ml/kg/hr  Temp (24hrs), Av.6 °F (36.4 °C), Min:96.7 °F (35.9 °C), Max:98.5 °F (36.9 °C)    Cultures:    Pleural fluid: heavy microaerophilic Strep (susceptibilities pending), final    Goal trough = 15 - 20 mcg/mL    Recent trough history (date/time/level/dose/action taken):  1/10 @ 1656 = 15.5 mcg/ml (drawn ~23.5 hrs post-dose), 1 gm IV x 1 given    Plan: The patient's Scr continued to increase today (2.17 to 2.42) so I am hesitant to order a maintenance dose due to unstable renal function. Will continue to dose based on levels and check another 24-hr level this evening at 1900. Pharmacy will continue to monitor patient daily and will make dosage adjustments based upon changing renal function.

## 2018-01-11 NOTE — PROGRESS NOTES
0800 Bedside and Verbal shift change report given to Katja Ely RN (oncoming nurse) by Kyleigh Dougherty RN (offgoing nurse). Report included the following information SBAR, Kardex, ED Summary, OR Summary, Procedure Summary, Intake/Output, MAR and Recent Results. 0800 Assumed care of pt. Pt resting comfortably, remains in afib, HR: 140s-150s. Pt states pain continues to be well controlled with PCA. 0830 Pt assisted to chair by RN.  1899 Dr. Logan Glass at bedside to evaluate pt. Orders received. 0930 Dig level sent. 1100 Interdisciplinary rounds held; discussed rapid afib, SBPs 80s-110s; pt remains asymptomatic, A&O, comfortable. No new orders; Dilt gtt D/C'd.  1120 Dr. Tiara Wells at bedside to evaluate pt, updated on pt status, Cardiology input. HR: 140s. Chest tube removed from suction at this time. 1430 Pt ambulating in hallway with PT. VSS. 2000 Bedside and Verbal shift change report given to Kyleigh Dougherty RN (oncoming nurse) by Katja Ely RN (offgoing nurse). Report included the following information SBAR, Kardex, ED Summary, OR Summary, Procedure Summary, Intake/Output, MAR and Recent Results.

## 2018-01-11 NOTE — PROGRESS NOTES
Problem: Falls - Risk of  Goal: *Absence of Falls  Document Beau Fall Risk and appropriate interventions in the flowsheet.    Outcome: Progressing Towards Goal  Fall Risk Interventions:  Mobility Interventions: Communicate number of staff needed for ambulation/transfer         Medication Interventions: Evaluate medications/consider consulting pharmacy    Elimination Interventions: Call light in reach

## 2018-01-11 NOTE — PROGRESS NOTES
Cardiology Progress Note  2018     Admit Date: 2018  Admit Diagnosis: SOB/recurrent pleural effusion/lung mass  Recurrent left pleural effusion  UNKNOWN  Melena  CC: none currently    Assessment:   Principal Problem:    Recurrent left pleural effusion (2018)      Plan:   Stable overall with reasonable rate control. Worsening renal function. Hold digoxin and check level. Increase fluids. No other changes for now. Volume status:euvolemic  Renal function: stable    For other plans, see orders.   Subjective: Gene Serra reports   Chest Pain:  [x]   none,  consistent with  []   non-cardiac   []   atypical   []   angina             [x]   none now    []      on-going  Dyspnea: [x]   none  []   at rest  []   with exertion     []   improved   []   unchanged   []   worsening  PND:       [x]   none  []   overnight    Orthopnea: [x]   none  []   improved  []   unchanged  []   worsening  Presyncope: [x]   none   []   improved    []   unchanged    []   worsening  Ambulated in hallway without symptoms  []   Yes  Ambulated in room without symptoms  []   Yes    Objective:    Physical Exam:  Overall VSSAF;    Visit Vitals    BP 91/59    Pulse (!) 135    Temp 97.4 °F (36.3 °C)    Resp 19    Ht 5' 8.11\" (1.73 m)    Wt 69.7 kg (153 lb 9.6 oz)    SpO2 99%    BMI 23.28 kg/m2     Temp (24hrs), Av.7 °F (36.5 °C), Min:97.4 °F (36.3 °C), Max:97.9 °F (36.6 °C)    Patient Vitals for the past 8 hrs:   Pulse   18 0900 (!) 135   18 0837 (!) 131   18 0800 (!) 150   18 0700 (!) 131   18 0600 (!) 123   18 0500 (!) 120   18 0400 (!) 131   18 0300 (!) 138   18 0200 (!) 132    Patient Vitals for the past 8 hrs:   Resp   18 0900 19   18 0800 (!) 32   18 0700 16   18 0600 (!) 7   18 0500 15   18 0400 11   18 0300 15   18 0200 12    Patient Vitals for the past 8 hrs:   BP   18 0900 91/59   18 0837 115/79   01/11/18 0800 115/79   01/11/18 0700 98/66   01/11/18 0600 (!) 88/61   01/11/18 0500 98/56   01/11/18 0400 90/60   01/11/18 0300 (!) 88/65   01/11/18 0200 (!) 86/61        Intake/Output Summary (Last 24 hours) at 01/11/18 0920  Last data filed at 01/11/18 0800   Gross per 24 hour   Intake           657.96 ml   Output             1100 ml   Net          -442.04 ml       General Appearance: Well developed, well nourished, no acute distress. Ears/Nose/Mouth/Throat:   Normal MM; anicteric. JVP: WNL   Resp:   Lungs diminished BS's Lt. Nl resp effort. Cardiovascular:  irreg S1, S2 normal, no new murmur. No gallop or rub. Abdomen:   Soft, non-tender, bowel sounds are present. Extremities: No edema bilaterally. Skin:  Neuro: Warm and dry. A/O x3, grossly nonfocal    []      cath site intact w/o hematoma or bruit; distal pulse unchanged. Data Review:     Telemetry independently reviewed : []   sinus  [x]   chronic afib   []   par afib  []      NSVT    ECG independently reviewed:  []   NSR   []   no significant changes  []   no new ECG provided for review  Lab results reviewed as noted below. Current medications reviewed as noted below. No results for input(s): PH, PCO2, PO2 in the last 72 hours. No results for input(s): CPK, CKMB, TROIQ in the last 72 hours. Recent Labs      01/11/18   0241  01/10/18   0515  01/09/18   0550   NA  140  140  138   K  4.4  4.5  3.6   CL  109*  110*  106   CO2  22  21  19*   BUN  36*  32*  20   CREA  2.42*  2.17*  1.37*   GLU  106*  107*  298*   PHOS  4.0   --    --    CA  8.2*  8.4*  7.1*   ALB  1.9*   --    --    WBC  15.2*  17.0*  9.8   HGB  8.8*  8.1*  8.2*   HCT  28.3*  26.0*  25.4*   PLT  436*  459*  462*     Recent Labs      01/11/18   0241   SGOT  8*   ALT  16   AP  150*   TBILI  0.6   TP  6.6   ALB  1.9*   GLOB  4.7*     No results for input(s): INR, PTP, APTT in the last 72 hours.     No lab exists for component: INREXT   No results for input(s): FE, TIBC, PSAT, FERR in the last 72 hours.    Lab Results   Component Value Date/Time    Glucose (POC) 104 01/09/2018 08:20 AM       Current Facility-Administered Medications   Medication Dose Route Frequency    senna (SENOKOT) tablet 8.6 mg  1 Tab Oral QHS    sodium chloride (NS) flush 5-10 mL  5-10 mL IntraVENous Q8H    sodium chloride (NS) flush 5-10 mL  5-10 mL IntraVENous PRN    oxyCODONE-acetaminophen (PERCOCET) 5-325 mg per tablet 2 Tab  2 Tab Oral Q4H PRN    HYDROmorphone (PF) 15 mg/30 ml (DILAUDID) PCA   IntraVENous CONTINUOUS    levoFLOXacin (LEVAQUIN) 750 mg in D5W IVPB  750 mg IntraVENous Q48H    dilTIAZem (CARDIZEM) 125 mg in dextrose 5% 125 mL infusion  0-15 mg/hr IntraVENous TITRATE    carvedilol (COREG) tablet 25 mg  25 mg Oral BID WITH MEALS    ondansetron (ZOFRAN) injection 4 mg  4 mg IntraVENous Q4H PRN    docusate sodium (COLACE) capsule 100 mg  100 mg Oral BID    albuterol-ipratropium (DUO-NEB) 2.5 MG-0.5 MG/3 ML  3 mL Nebulization Q4H PRN    lactobac ac& pc-s.therm-b.anim (SALLIE Q/RISAQUAD)  1 Cap Oral DAILY    pantoprazole (PROTONIX) tablet 40 mg  40 mg Oral ACB&D    Vancomycin - Pharmacy to Dose   Other Rx Dosing/Monitoring    piperacillin-tazobactam (ZOSYN) 10.125 g in  mL continuous 24 hr infusion  10.125 g IntraVENous Q24H        Bishop Abbey MD

## 2018-01-11 NOTE — PROGRESS NOTES
Intensivist    No complaints  Cardiology eval appreciated  Off of cardizem gtt  Dig level pnd    Chest tube to suction    Left pleural effusion - empyema  1/9/2018 - Procedure(s):  LEFT VIDEO ASSISTED THOROCOSCOPY, MINI AXILLARY THOROCOTOMY, FULL PULMONARY DECORTICATION   Microaerophillic strep species in fluid     Anemia  S/p transfusion     H/o afib    --OK to move out of ICU from a critical care standpoint  Cardiology OK with afib and HR < 140  --dig level pending  --chest tube per T surg  --on zosyn and vanc - follow up final pleural fluid cx /sensitivities to adjust abx    Fritz Zuniga MD

## 2018-01-11 NOTE — PROGRESS NOTES
Hospitalist Progress Note  Idalia Connolly MD  Answering service: [phone] OR [phone] from in house phone  Cell: [phone]      Date of Service:  2018  NAME:  Estuardo Bartlett  :  1945  MRN:  [id number]      Admission Summary:   66 yo male with PMHx of COPD, Hx of Left pleural effusion, Left lung mass, Atrial Fib, HTN, Hx of GI bleed s/p EGD 2017, taken off Asa/coumadin and Naproxen at that time. Pt states he had dark BMs before Luis Alfredo but they have resolved and have not recurred. Pt now admitted for worsening SOB over past 1 week that is worse with exertion. Pt denies any chest pain, fever, chills, cough, n/v, diarrhea, abdominal pain. Pt went to [facility] who transferred patient to [facility] for possible VATS. Interval history / Subjective:     Pt seen and examined  Feels well  No chest pain, sob, nv/d. Assessment & Plan:     Recurrent Large Left Pleural effusion, r/o malignancy  -sec to left empyema  - hx of COPD , Ex-smoker  -s/p chest tube  with purulent drainage  - s/p VATS   -culture shows microaerophilic streptococcous  -On Vanc/Zosyn/LVQ, Will talk to pulmonary and stop Vanc and LVQ  -Appreciate Thoracic surgery/Pulmonary    Hx of Lung Mass  - not seen clearly on CTA chest   - will need repeat CT at some point  - Thoracic surgery following    Atrial Fib with RVR  - now on Coreg. Dig on hold.  Per cardiology no need for cardizem  - not on Coumadin/ASA since 2017 due to GI Bleed   - Plan to start Eliquis next month  -Appreciate Cardiology input    Anemia, appears Chronic  - hb 8.2 at [facility]  - unclear if was lab error  - s/p 2U PRBCs  - recent GI work up included EGD which did not show active bleeding per patient  - patient is to have repeat EGD and Colon at some point as outpatient  - PPI BID     MARY  -Sec to dehydration vs med (Vanc)  -Will consider stopping Vanc if ok with Pulmonary  -Fluids started by cardiology 1/11, monitor for overload    Chronic systolic CHF  -not sure if the patient had acute on chronic CHF  -EF 30-35% with NO RWMA. Moderate to severe MR. Severe left atrium, right atrium dilatation  -Appreciate cardiology input    Pulmonary hypertension  -Will discuss with pulmonary tomorrow    COPD, chronic  - does not appear to have exacerbation  - d/c steroids  - Nebs PRN     Compressive Atelectasis  - no obvious symptoms of Pneumonia at this time    Code status: FULL  DVT prophylaxis: Brenda Lynch discussed with: Patient/Family, Nurse and Consultant Thoracic surgery  Disposition: Home w/Family     Hospital Problems  Date Reviewed: 1/8/2018          Codes Class Noted POA    * (Principal)Recurrent left pleural effusion ICD-10-CM: J90  ICD-9-CM: 511.9  1/8/2018 Yes                Review of Systems:   A comprehensive review of systems was negative except for that written in the HPI. Vital Signs:    Last 24hrs VS reviewed since prior progress note. Most recent are:  Visit Vitals    BP 93/82    Pulse (!) 129    Temp 97.3 °F (36.3 °C)    Resp 20    Ht 5' 8.11\" (1.73 m)    Wt 69.7 kg (153 lb 9.6 oz)    SpO2 95%    BMI 23.28 kg/m2         Intake/Output Summary (Last 24 hours) at 01/11/18 1536  Last data filed at 01/11/18 1300   Gross per 24 hour   Intake            397.5 ml   Output             1165 ml   Net           -767.5 ml        Physical Examination:             Constitutional:  No acute distress, cooperative, pleasant    ENT:  Oral mucous moist,    Resp:  clear on Rt, diminished on left   CV:  Irregular     GI:  Soft, non distended, non tender. normoactive bowel sounds    Musculoskeletal:  No edema, warm, 2+ pulses throughout    Neurologic:  Moves all extremities.   AAOx3, RUE contracture-chronic      Skin:  Good turgor, no rashes or ulcers       Data Review:    Review and/or order of clinical lab test  Review and/or order of tests in the radiology section of CPT  Review and/or order of tests in the medicine section of CPT      Labs:     Recent Labs      01/11/18   0241  01/10/18   0515   WBC  15.2*  17.0*   HGB  8.8*  8.1*   HCT  28.3*  26.0*   PLT  436*  459*     Recent Labs      01/11/18   0241  01/10/18   0515  01/09/18   0550   NA  140  140  138   K  4.4  4.5  3.6   CL  109*  110*  106   CO2  22  21  19*   BUN  36*  32*  20   CREA  2.42*  2.17*  1.37*   GLU  106*  107*  298*   CA  8.2*  8.4*  7.1*   MG  1.9   --    --    PHOS  4.0   --    --      Recent Labs      01/11/18 0241   SGOT  8*   ALT  16   AP  150*   TBILI  0.6   TP  6.6   ALB  1.9*   GLOB  4.7*     No results for input(s): INR, PTP, APTT in the last 72 hours. No lab exists for component: INREXT, INREXT   No results for input(s): FE, TIBC, PSAT, FERR in the last 72 hours. Lab Results   Component Value Date/Time    Folate 26.6 01/08/2018 03:34 AM      No results for input(s): PH, PCO2, PO2 in the last 72 hours. No results for input(s): CPK, CKNDX, TROIQ in the last 72 hours. No lab exists for component: CPKMB  No results found for: CHOL, CHOLX, CHLST, CHOLV, HDL, LDL, LDLC, DLDLP, TGLX, TRIGL, TRIGP, CHHD, CHHDX  Lab Results   Component Value Date/Time    Glucose (POC) 104 01/09/2018 08:20 AM     No results found for: COLOR, APPRN, SPGRU, REFSG, JEANNINE, PROTU, GLUCU, KETU, BILU, UROU, ANKUR, LEUKU, GLUKE, EPSU, BACTU, WBCU, RBCU, CASTS, UCRY      Medications Reviewed:     Current Facility-Administered Medications   Medication Dose Route Frequency    0.9% sodium chloride infusion  75 mL/hr IntraVENous CONTINUOUS    Vancomycin 24-hr level due 1/11 @ 1900. Thanks!    Other ONCE    senna (SENOKOT) tablet 8.6 mg  1 Tab Oral QHS    sodium chloride (NS) flush 5-10 mL  5-10 mL IntraVENous Q8H    sodium chloride (NS) flush 5-10 mL  5-10 mL IntraVENous PRN    oxyCODONE-acetaminophen (PERCOCET) 5-325 mg per tablet 2 Tab  2 Tab Oral Q4H PRN    HYDROmorphone (PF) 15 mg/30 ml (DILAUDID) PCA   IntraVENous CONTINUOUS    levoFLOXacin (LEVAQUIN) 750 mg in D5W IVPB  750 mg IntraVENous Q48H    carvedilol (COREG) tablet 25 mg  25 mg Oral BID WITH MEALS    ondansetron (ZOFRAN) injection 4 mg  4 mg IntraVENous Q4H PRN    docusate sodium (COLACE) capsule 100 mg  100 mg Oral BID    albuterol-ipratropium (DUO-NEB) 2.5 MG-0.5 MG/3 ML  3 mL Nebulization Q4H PRN    lactobac ac& pc-s.therm-b.anim (SALLIE Q/RISAQUAD)  1 Cap Oral DAILY    pantoprazole (PROTONIX) tablet 40 mg  40 mg Oral ACB&D    Vancomycin - Pharmacy to Dose   Other Rx Dosing/Monitoring    piperacillin-tazobactam (ZOSYN) 10.125 g in  mL continuous 24 hr infusion  10.125 g IntraVENous Q24H     ______________________________________________________________________  EXPECTED LENGTH OF STAY: 10d 0h  ACTUAL LENGTH OF STAY:          MD Kishor

## 2018-01-12 ENCOUNTER — APPOINTMENT (OUTPATIENT)
Dept: GENERAL RADIOLOGY | Age: 73
DRG: 164 | End: 2018-01-12
Attending: NURSE PRACTITIONER
Payer: MEDICARE

## 2018-01-12 LAB
ANION GAP SERPL CALC-SCNC: 9 MMOL/L (ref 5–15)
BASOPHILS # BLD: 0 K/UL (ref 0–0.1)
BASOPHILS NFR BLD: 0 % (ref 0–1)
BUN SERPL-MCNC: 32 MG/DL (ref 6–20)
BUN/CREAT SERPL: 15 (ref 12–20)
CALCIUM SERPL-MCNC: 8.4 MG/DL (ref 8.5–10.1)
CHLORIDE SERPL-SCNC: 110 MMOL/L (ref 97–108)
CO2 SERPL-SCNC: 21 MMOL/L (ref 21–32)
CREAT SERPL-MCNC: 2.11 MG/DL (ref 0.7–1.3)
CREAT UR-MCNC: 71.5 MG/DL
EOSINOPHIL # BLD: 0.1 K/UL (ref 0–0.4)
EOSINOPHIL NFR BLD: 1 % (ref 0–7)
ERYTHROCYTE [DISTWIDTH] IN BLOOD BY AUTOMATED COUNT: 19 % (ref 11.5–14.5)
GLUCOSE SERPL-MCNC: 100 MG/DL (ref 65–100)
HCT VFR BLD AUTO: 28.6 % (ref 36.6–50.3)
HGB BLD-MCNC: 8.8 G/DL (ref 12.1–17)
LYMPHOCYTES # BLD: 1.5 K/UL (ref 0.8–3.5)
LYMPHOCYTES NFR BLD: 12 % (ref 12–49)
MCH RBC QN AUTO: 26.2 PG (ref 26–34)
MCHC RBC AUTO-ENTMCNC: 30.8 G/DL (ref 30–36.5)
MCV RBC AUTO: 85.1 FL (ref 80–99)
MONOCYTES # BLD: 0.9 K/UL (ref 0–1)
MONOCYTES NFR BLD: 7 % (ref 5–13)
NEUTS SEG # BLD: 10.6 K/UL (ref 1.8–8)
NEUTS SEG NFR BLD: 80 % (ref 32–75)
PLATELET # BLD AUTO: 431 K/UL (ref 150–400)
POTASSIUM SERPL-SCNC: 4.3 MMOL/L (ref 3.5–5.1)
PROT UR-MCNC: 45 MG/DL (ref 0–11.9)
RBC # BLD AUTO: 3.36 M/UL (ref 4.1–5.7)
SODIUM SERPL-SCNC: 140 MMOL/L (ref 136–145)
SODIUM UR-SCNC: 97 MMOL/L
VANCOMYCIN SERPL-MCNC: 17.3 UG/ML
WBC # BLD AUTO: 13.1 K/UL (ref 4.1–11.1)

## 2018-01-12 PROCEDURE — 74011250636 HC RX REV CODE- 250/636: Performed by: INTERNAL MEDICINE

## 2018-01-12 PROCEDURE — 74011250637 HC RX REV CODE- 250/637: Performed by: NURSE PRACTITIONER

## 2018-01-12 PROCEDURE — 80048 BASIC METABOLIC PNL TOTAL CA: CPT | Performed by: INTERNAL MEDICINE

## 2018-01-12 PROCEDURE — 71045 X-RAY EXAM CHEST 1 VIEW: CPT

## 2018-01-12 PROCEDURE — 82570 ASSAY OF URINE CREATININE: CPT | Performed by: HOSPITALIST

## 2018-01-12 PROCEDURE — 74011250637 HC RX REV CODE- 250/637: Performed by: INTERNAL MEDICINE

## 2018-01-12 PROCEDURE — 84156 ASSAY OF PROTEIN URINE: CPT | Performed by: HOSPITALIST

## 2018-01-12 PROCEDURE — 36415 COLL VENOUS BLD VENIPUNCTURE: CPT | Performed by: INTERNAL MEDICINE

## 2018-01-12 PROCEDURE — 74011000250 HC RX REV CODE- 250: Performed by: INTERNAL MEDICINE

## 2018-01-12 PROCEDURE — 84300 ASSAY OF URINE SODIUM: CPT | Performed by: HOSPITALIST

## 2018-01-12 PROCEDURE — 74011250636 HC RX REV CODE- 250/636: Performed by: THORACIC SURGERY (CARDIOTHORACIC VASCULAR SURGERY)

## 2018-01-12 PROCEDURE — 80202 ASSAY OF VANCOMYCIN: CPT | Performed by: INTERNAL MEDICINE

## 2018-01-12 PROCEDURE — 85025 COMPLETE CBC W/AUTO DIFF WBC: CPT | Performed by: INTERNAL MEDICINE

## 2018-01-12 PROCEDURE — 97116 GAIT TRAINING THERAPY: CPT

## 2018-01-12 PROCEDURE — 74011250637 HC RX REV CODE- 250/637: Performed by: THORACIC SURGERY (CARDIOTHORACIC VASCULAR SURGERY)

## 2018-01-12 PROCEDURE — 0W9930Z DRAINAGE OF RIGHT PLEURAL CAVITY WITH DRAINAGE DEVICE, PERCUTANEOUS APPROACH: ICD-10-PCS | Performed by: THORACIC SURGERY (CARDIOTHORACIC VASCULAR SURGERY)

## 2018-01-12 PROCEDURE — 74011250637 HC RX REV CODE- 250/637: Performed by: FAMILY MEDICINE

## 2018-01-12 PROCEDURE — 65610000006 HC RM INTENSIVE CARE

## 2018-01-12 RX ORDER — FUROSEMIDE 10 MG/ML
40 INJECTION INTRAMUSCULAR; INTRAVENOUS ONCE
Status: COMPLETED | OUTPATIENT
Start: 2018-01-12 | End: 2018-01-12

## 2018-01-12 RX ORDER — MAGNESIUM CITRATE
296 SOLUTION, ORAL ORAL
Status: COMPLETED | OUTPATIENT
Start: 2018-01-12 | End: 2018-01-12

## 2018-01-12 RX ORDER — DIGOXIN 125 MCG
0.12 TABLET ORAL DAILY
Status: DISCONTINUED | OUTPATIENT
Start: 2018-01-12 | End: 2018-01-14 | Stop reason: HOSPADM

## 2018-01-12 RX ADMIN — SENNOSIDES 8.6 MG: 8.6 TABLET, FILM COATED ORAL at 21:38

## 2018-01-12 RX ADMIN — IPRATROPIUM BROMIDE AND ALBUTEROL SULFATE 3 ML: .5; 3 SOLUTION RESPIRATORY (INHALATION) at 00:49

## 2018-01-12 RX ADMIN — DIGOXIN 0.12 MG: 125 TABLET ORAL at 09:31

## 2018-01-12 RX ADMIN — BISACODYL 1 ENEMA: 10 ENEMA RECTAL at 21:37

## 2018-01-12 RX ADMIN — Medication: at 18:43

## 2018-01-12 RX ADMIN — Medication 10 ML: at 21:38

## 2018-01-12 RX ADMIN — CARVEDILOL 25 MG: 12.5 TABLET, FILM COATED ORAL at 17:07

## 2018-01-12 RX ADMIN — CARVEDILOL 25 MG: 12.5 TABLET, FILM COATED ORAL at 08:08

## 2018-01-12 RX ADMIN — MAGNESIUM CITRATE 296 ML: 1.75 LIQUID ORAL at 11:32

## 2018-01-12 RX ADMIN — Medication 1 CAPSULE: at 08:08

## 2018-01-12 RX ADMIN — Medication 10 ML: at 06:21

## 2018-01-12 RX ADMIN — DOCUSATE SODIUM 100 MG: 100 CAPSULE, LIQUID FILLED ORAL at 17:07

## 2018-01-12 RX ADMIN — IPRATROPIUM BROMIDE AND ALBUTEROL SULFATE 3 ML: .5; 3 SOLUTION RESPIRATORY (INHALATION) at 20:46

## 2018-01-12 RX ADMIN — DOCUSATE SODIUM 100 MG: 100 CAPSULE, LIQUID FILLED ORAL at 08:08

## 2018-01-12 RX ADMIN — PANTOPRAZOLE SODIUM 40 MG: 40 TABLET, DELAYED RELEASE ORAL at 17:07

## 2018-01-12 RX ADMIN — PANTOPRAZOLE SODIUM 40 MG: 40 TABLET, DELAYED RELEASE ORAL at 08:08

## 2018-01-12 RX ADMIN — FUROSEMIDE 40 MG: 10 INJECTION, SOLUTION INTRAVENOUS at 21:37

## 2018-01-12 RX ADMIN — PIPERACILLIN AND TAZOBACTAM 10.12 G: 3; .375 INJECTION, POWDER, FOR SOLUTION INTRAVENOUS at 17:48

## 2018-01-12 RX ADMIN — Medication 10 ML: at 14:00

## 2018-01-12 NOTE — PROGRESS NOTES
Thoracic Surgery Simple Progress Note    Admit Date: 2018  POD: 3 Days Post-Op      Procedure:  Procedure(s):  LEFT VIDEO ASSISTED THOROCOSCOPY, MINI AXILLARY THOROCOTOMY, FULL PULMONARY DECORTICATION      Subjective:     Patient has complaints: Constipation    Review of Systems:    CARDIAC: negative  RESP: positive for emphysema or effusion  NEURO:  positive for H/O CVA  INCISION: Clean, dry, and intact  EXT: Denies new swelling or pain in the legs or calves. Objective:     Blood pressure (!) 149/131, pulse (!) 124, temperature 98.2 °F (36.8 °C), resp. rate 12, height 5' 8.11\" (1.73 m), weight 155 lb 8 oz (70.5 kg), SpO2 97 %. Temp (24hrs), Av °F (36.7 °C), Min:97.3 °F (36.3 °C), Max:98.2 °F (36.8 °C)        Hemodynamics    PAP    CO    CI     0701 -  1900  In: 225 [I.V.:225]  Out: 20   01/10 1901 -  0700  In: 1747.5 [I.V.:1747.5]  Out: 1520 [Urine:1300]    EXAM:  GENERAL: VSS, afrible, alert and cooperative  HEART:  regularly irregular rhythm, A-fib with rate in 120-140s  LUNG: RR even unlabored on NC O2. CXR this morning reveals a moderate right sided PTX, Left side is stable. CT x 2 on left drained 120 ml, intermittent air leak noted  NEURO:  normal without focal findings  mental status, speech normal, alert and oriented x iii  INCISION: Clean, dry, and intact  EXTREMITIES:No evidence of DVT seen on physical exam.  GI/: Abd soft, nonterder with + bowel sounds.  No BM yet Voiding    Labs:  Recent Results (from the past 24 hour(s))   VANCOMYCIN, RANDOM    Collection Time: 18  6:44 PM   Result Value Ref Range    Vancomycin, random 18.8 UG/ML   CBC WITH AUTOMATED DIFF    Collection Time: 18  4:25 AM   Result Value Ref Range    WBC 13.1 (H) 4.1 - 11.1 K/uL    RBC 3.36 (L) 4.10 - 5.70 M/uL    HGB 8.8 (L) 12.1 - 17.0 g/dL    HCT 28.6 (L) 36.6 - 50.3 %    MCV 85.1 80.0 - 99.0 FL    MCH 26.2 26.0 - 34.0 PG    MCHC 30.8 30.0 - 36.5 g/dL    RDW 19.0 (H) 11.5 - 14.5 %    PLATELET 191 (H) 150 - 400 K/uL    NEUTROPHILS 80 (H) 32 - 75 %    LYMPHOCYTES 12 12 - 49 %    MONOCYTES 7 5 - 13 %    EOSINOPHILS 1 0 - 7 %    BASOPHILS 0 0 - 1 %    ABS. NEUTROPHILS 10.6 (H) 1.8 - 8.0 K/UL    ABS. LYMPHOCYTES 1.5 0.8 - 3.5 K/UL    ABS. MONOCYTES 0.9 0.0 - 1.0 K/UL    ABS. EOSINOPHILS 0.1 0.0 - 0.4 K/UL    ABS. BASOPHILS 0.0 0.0 - 0.1 K/UL   METABOLIC PANEL, BASIC    Collection Time: 01/12/18  4:25 AM   Result Value Ref Range    Sodium 140 136 - 145 mmol/L    Potassium 4.3 3.5 - 5.1 mmol/L    Chloride 110 (H) 97 - 108 mmol/L    CO2 21 21 - 32 mmol/L    Anion gap 9 5 - 15 mmol/L    Glucose 100 65 - 100 mg/dL    BUN 32 (H) 6 - 20 MG/DL    Creatinine 2.11 (H) 0.70 - 1.30 MG/DL    BUN/Creatinine ratio 15 12 - 20      GFR est AA 38 (L) >60 ml/min/1.73m2    GFR est non-AA 31 (L) >60 ml/min/1.73m2    Calcium 8.4 (L) 8.5 - 10.1 MG/DL   VANCOMYCIN, RANDOM    Collection Time: 01/12/18  4:25 AM   Result Value Ref Range    Vancomycin, random 17.3 UG/ML   CREATININE, UR, RANDOM    Collection Time: 01/12/18  5:46 AM   Result Value Ref Range    Creatinine, urine 71.50 mg/dL   SODIUM, UR, RANDOM    Collection Time: 01/12/18  5:46 AM   Result Value Ref Range    Sodium urine, random 97 MMOL/L   PROTEIN URINE, RANDOM    Collection Time: 01/12/18  5:46 AM   Result Value Ref Range    Protein, urine random 45 (H) 0.0 - 11.9 mg/dL       Assessment:   No evidence of DVT.   Principal Problem:    Recurrent left pleural effusion (1/8/2018)      Plan/Recommendations:   Pigtail catheter inserted on the right side at 9:00 placed to suction  Left posterior chest tube removed  Post insertion CXR ordered  Magnesium citrate today  Wean O2 per Pulmonary  HR control per Cardiology  See orders    Signed By: Sin MERAZ

## 2018-01-12 NOTE — PROGRESS NOTES
Pulmonary / [place] [place] Day: 6     S:    Feels well  L chest tube in place  Afebrile  Remains in afib - dig level ok and restarted by cardiology  Surprisingly, CXR from this am with new R sided PTX  Pigtail cath placed on R by Throacic surgery with re-expansion of R lung on follow up CXR     O:    Patient Vitals for the past 4 hrs:   BP Temp Pulse Resp SpO2   18 1000 (!) 149/131 - (!) 124 12 97 %   18 0931 (!) 135/102 - (!) 110 - -   18 0900 (!) 135/102 - (!) 128 15 100 %   18 0808 130/89 - (!) 111 - -   18 0800 130/89 98.2 °F (36.8 °C) (!) 125 13 96 %   18 0700 116/79 - (!) 121 12 98 %     Temp (24hrs), Av °F (36.7 °C), Min:97.3 °F (36.3 °C), Max:98.2 °F (36.8 °C)      Intake/Output Summary (Last 24 hours) at 18 1028  Last data filed at 18 1000   Gross per 24 hour   Intake             1800 ml   Output              900 ml   Net              900 ml     Exam:     General:  Well developed, No acute distress   HEENT:  Sclera anicteric, Mucous membranes moist   Neck:  No accessory muscle use, No JVD, Supple   Chest:  Symmetrical in expansion, L chest tube, R sided pigtail catheter   Lungs:  Decreased BS on L   Heart:  Irreg   Abdomen:  Soft, ND / NT, Bowel sounds present   Extremities:  No cyanosis, No clubbing, No edema   Skin:  Warm / Dry   Neuro:  Alert, Grossly non-focal      Imaging: Images independently viewed  CXR - L chest tube and new R moderate PTX    CXR post R pigtail cather placement - re-expansion of R lung    Labs:  Lab:  Recent Labs      18   0425  18   0241  01/10/18   0515   WBC  13.1*  15.2*  17.0*   HGB  8.8*  8.8*  8.1*   PLT  431*  436*  459*   NA  140  140  140   K  4.3  4.4  4.5   CL  110*  109*  110*   CO2  21  22  21   BUN  32*  36*  32*   CREA  2.11*  2.42*  2.17*   GLU  100  106*  107*   CA  8.4*  8.2*  8.4*   MG   --   1.9   --    PHOS   --   4.0   --    TBILI   --   0.6   --    SGOT   --   8*   --          A/P: Left pleural effusion - empyema  1/9/2018 - Procedure(s):  LEFT VIDEO ASSISTED THOROCOSCOPY, MINI AXILLARY THOROCOTOMY, FULL PULMONARY DECORTICATION   Microaerophillic strep species in fluid      Anemia  S/p transfusion      H/o afib  Reduced LV EF  On dig  Cardiology following    Spontaneous R sided PTX s/p pigtail catheter 1/12    --continue zosyn and vanc - follow up final id and sensitivities from state lab  --afib per cardiology  --Chest tubes per thoracic surgery    Will have coverage see over the weekend if needed    Won Foster MD

## 2018-01-12 NOTE — PROGRESS NOTES
Problem: Mobility Impaired (Adult and Pediatric)  Goal: *Acute Goals and Plan of Care (Insert Text)  Physical Therapy Goals  Initiated 1/11/2018  1. Patient will move from supine to sit and sit to supine  and scoot up and down in bed with supervision/set-up within 7 day(s). 2.  Patient will transfer from bed to chair and chair to bed with supervision/set-up using the least restrictive device within 7 day(s). 3.  Patient will perform sit to stand with supervision/set-up within 7 day(s). 4.  Patient will ambulate with supervision/set-up for 400 feet with the least restrictive device within 7 day(s). 5.  Patient will ascend/descend 16 stairs with L or R handrail with supervision/set-up within 7 day(s). Outcome: Progressing Towards Goal  RN cleared pt for participation in PT. Pt demonstrates increasing endurance and safety awareness with mobility training and is progressing towards his acute care goals. He demonstrated safe sit<>stand transfers with SUP and ambulated 200 feet with SUP and gait belt. Stair training deferred to till next session due to B chest tubes and lines/leads. Continue to recommend home with no skilled PT needs pending stair evaluation. Pt was left supine in bed with all needs met. RN notified.

## 2018-01-12 NOTE — PROGRESS NOTES
Pharmacist Note - Vancomycin Dosing  Therapy day 4  Indication: Left empyema, CAP   Current regimen: Based on levels, last dose: 1 gm IV on 1/10 @ 1914    A Random Level resulted at 18.8 mcg/mL which was obtained ~23.5 hrs post-dose. Goal trough: 15 - 20 mcg/mL     Plan: Although level is within goal range will hold off on re-dosing for now to avoid accumulation and further kidney injury given rising SCr. Will recheck level with AM labs. Pharmacy will continue to monitor this patient daily for changes in clinical status and renal function.

## 2018-01-12 NOTE — PROGRESS NOTES
0800 Bedside and Verbal shift change report given to Miriam Vaughn RN (oncoming nurse) by Tessa Carbajal RN (offgoing nurse). Report included the following information SBAR, Kardex, ED Summary, OR Summary, Procedure Summary, Intake/Output, MAR and Recent Results. 0800 Assumed care of pt. Off-going RN states Dr. Brain Hernandez aware of pneumo on AM CXR.  0830 Jessica Leal NP Thoracic Surgery, at bedside to discuss plan of care with pt. Consent obtained for pigtail drain for R pneumo. 3264 Dr. Brain Hernandez at bedside to place R pigtail drain. Atrium system placed to wall suction. 0900 L posterior chest tube removed by Jessica Leal NP. Intermittent air-leak present, NP aware. 1130 Pt provided with MagCitrate per [facility]- up with two assist to bedside commode, still no BM, returned to bed.  1721 Dr. Brittany Cao updated on pt status. Pt's lungs wet bilaterally to auscultation. OK to stop MIVF. No Lasix for now due to renal function, will continue to monitor. 2000 Bedside and Verbal shift change report given to Tessa Carbajal RN (oncoming nurse) by Miriam Vaughn RN (offgoing nurse). Report included the following information SBAR, Kardex, ED Summary, Procedure Summary, Intake/Output, MAR and Recent Results.

## 2018-01-12 NOTE — PROCEDURES
Mr. Marilyn Schultz was noted to have a right sided pneumothorax on morning CXR this morning. The exact etiology is unknown but is unrelated to his left empyema and prior decortication. Pre-op diagnosis: right pneumothorax    Post op diagnosis: same    Surgeon: Lety Del Real    Anesthesia: 10ml 1% lidocaine local    EBL <5ml    Procedure in detail. After informed consent was obatined the right chest was prepped and draped in a sterile fashion. Radiographs reviewed and laterality conformed. Time out performed. Lidocaine used as local anesthetic. Anteriorly, in the midclavicular line, using [employer] techinique an 8.5Fr pigtail catheter was inserted. Whoosh of air appreciated. Sutured in place and connected to a pleur-a-vac. No immediate complications identified. Patient tolerated the procedure well.      CXR pending

## 2018-01-12 NOTE — PROGRESS NOTES
Cardiology Progress Note  2018     Admit Date: 2018  Admit Diagnosis: SOB/recurrent pleural effusion/lung mass  Recurrent left pleural effusion  UNKNOWN  Melena  CC: none currently    Assessment:   Principal Problem:    Recurrent left pleural effusion (2018)      Plan:   Stable overall and rate control is reasonable. Creatinine shows slight improvement. Digoxin 0.7. Restart digoxin and follow levels. Stable CV status. SOR. Volume status:euvolemic  Renal function: stable    For other plans, see orders.   Subjective: Sharron Grider reports   Chest Pain:  [x]   none,  consistent with  []   non-cardiac   []   atypical   []   angina             [x]   none now    []      on-going  Dyspnea: [x]   none  []   at rest  []   with exertion     []   improved   []   unchanged   []   worsening  PND:       [x]   none  []   overnight    Orthopnea: [x]   none  []   improved  []   unchanged  []   worsening  Presyncope: [x]   none   []   improved    []   unchanged    []   worsening  Ambulated in hallway without symptoms  []   Yes  Ambulated in room without symptoms  []   Yes    Objective:    Physical Exam:  Overall VSSAF;    Visit Vitals    /89    Pulse (!) 111    Temp 98.2 °F (36.8 °C)    Resp 13    Ht 5' 8.11\" (1.73 m)    Wt 70.5 kg (155 lb 8 oz)    SpO2 96%    BMI 23.57 kg/m2     Temp (24hrs), Av °F (36.7 °C), Min:97.3 °F (36.3 °C), Max:98.2 °F (36.8 °C)    Patient Vitals for the past 8 hrs:   Pulse   18 0808 (!) 111   18 0800 (!) 125   18 0700 (!) 121   18 0600 (!) 118   18 0500 (!) 124   18 0400 (!) 110   18 0300 (!) 130   18 0200 (!) 126   18 0100 (!) 131    Patient Vitals for the past 8 hrs:   Resp   18 0800 13   18 0700 12   18 0600 23   18 0500 24   18 0400 18   18 0300 20   18 0200 18   18 0100 18    Patient Vitals for the past 8 hrs:   BP   18 0808 130/89   18 0800 130/89   01/12/18 0700 116/79   01/12/18 0600 137/74   01/12/18 0500 127/84   01/12/18 0400 123/75   01/12/18 0300 116/86   01/12/18 0200 133/90   01/12/18 0100 (!) 136/92        Intake/Output Summary (Last 24 hours) at 01/12/18 0839  Last data filed at 01/12/18 0800   Gross per 24 hour   Intake           1672.5 ml   Output              900 ml   Net            772.5 ml       General Appearance: Well developed, well nourished, no acute distress. Ears/Nose/Mouth/Throat:   Normal MM; anicteric. JVP: WNL   Resp:   Lungs decreased on Lt. Nl resp effort. Cardiovascular:  irreg, S1, S2 normal, no new murmur. No gallop or rub. Abdomen:   Soft, non-tender, bowel sounds are present. Extremities: No edema bilaterally. Skin:  Neuro: Warm and dry. A/O x3, grossly nonfocal    []      cath site intact w/o hematoma or bruit; distal pulse unchanged. Data Review:     Telemetry independently reviewed : []   sinus  [x]   chronic afib   []   par afib  []      NSVT    ECG independently reviewed:  []   NSR   []   no significant changes  []   no new ECG provided for review  Lab results reviewed as noted below. Current medications reviewed as noted below. No results for input(s): PH, PCO2, PO2 in the last 72 hours. No results for input(s): CPK, CKMB, TROIQ in the last 72 hours. Recent Labs      01/12/18   0425  01/11/18   0241  01/10/18   0515   NA  140  140  140   K  4.3  4.4  4.5   CL  110*  109*  110*   CO2  21  22  21   BUN  32*  36*  32*   CREA  2.11*  2.42*  2.17*   GLU  100  106*  107*   PHOS   --   4.0   --    CA  8.4*  8.2*  8.4*   ALB   --   1.9*   --    WBC  13.1*  15.2*  17.0*   HGB  8.8*  8.8*  8.1*   HCT  28.6*  28.3*  26.0*   PLT  431*  436*  459*     Recent Labs      01/11/18   0241   SGOT  8*   ALT  16   AP  150*   TBILI  0.6   TP  6.6   ALB  1.9*   GLOB  4.7*     No results for input(s): INR, PTP, APTT in the last 72 hours.     No lab exists for component: INREXT   No results for input(s): FE, TIBC, PSAT, FERR in the last 72 hours.    Lab Results   Component Value Date/Time    Glucose (POC) 104 01/09/2018 08:20 AM       Current Facility-Administered Medications   Medication Dose Route Frequency    digoxin (LANOXIN) tablet 0.125 mg  0.125 mg Oral DAILY    0.9% sodium chloride infusion  75 mL/hr IntraVENous CONTINUOUS    senna (SENOKOT) tablet 8.6 mg  1 Tab Oral QHS    sodium chloride (NS) flush 5-10 mL  5-10 mL IntraVENous Q8H    sodium chloride (NS) flush 5-10 mL  5-10 mL IntraVENous PRN    oxyCODONE-acetaminophen (PERCOCET) 5-325 mg per tablet 2 Tab  2 Tab Oral Q4H PRN    HYDROmorphone (PF) 15 mg/30 ml (DILAUDID) PCA   IntraVENous CONTINUOUS    levoFLOXacin (LEVAQUIN) 750 mg in D5W IVPB  750 mg IntraVENous Q48H    carvedilol (COREG) tablet 25 mg  25 mg Oral BID WITH MEALS    ondansetron (ZOFRAN) injection 4 mg  4 mg IntraVENous Q4H PRN    docusate sodium (COLACE) capsule 100 mg  100 mg Oral BID    albuterol-ipratropium (DUO-NEB) 2.5 MG-0.5 MG/3 ML  3 mL Nebulization Q4H PRN    lactobac ac& pc-s.therm-b.anim (SALLIE Q/RISAQUAD)  1 Cap Oral DAILY    pantoprazole (PROTONIX) tablet 40 mg  40 mg Oral ACB&D    Vancomycin - Pharmacy to Dose   Other Rx Dosing/Monitoring    piperacillin-tazobactam (ZOSYN) 10.125 g in  mL continuous 24 hr infusion  10.125 g IntraVENous Q24H        Anca Oneill MD

## 2018-01-12 NOTE — PROGRESS NOTES
Problem: Falls - Risk of  Goal: *Absence of Falls  Document Beau Fall Risk and appropriate interventions in the flowsheet.    Outcome: Progressing Towards Goal  Fall Risk Interventions:  Mobility Interventions: Patient to call before getting OOB         Medication Interventions: Patient to call before getting OOB    Elimination Interventions: Call light in reach

## 2018-01-13 ENCOUNTER — APPOINTMENT (OUTPATIENT)
Dept: GENERAL RADIOLOGY | Age: 73
DRG: 164 | End: 2018-01-13
Attending: NURSE PRACTITIONER
Payer: MEDICARE

## 2018-01-13 ENCOUNTER — APPOINTMENT (OUTPATIENT)
Dept: GENERAL RADIOLOGY | Age: 73
DRG: 164 | End: 2018-01-13
Attending: THORACIC SURGERY (CARDIOTHORACIC VASCULAR SURGERY)
Payer: MEDICARE

## 2018-01-13 PROCEDURE — 71045 X-RAY EXAM CHEST 1 VIEW: CPT

## 2018-01-13 PROCEDURE — 65610000006 HC RM INTENSIVE CARE

## 2018-01-13 PROCEDURE — 74011250637 HC RX REV CODE- 250/637: Performed by: THORACIC SURGERY (CARDIOTHORACIC VASCULAR SURGERY)

## 2018-01-13 PROCEDURE — 74011250637 HC RX REV CODE- 250/637: Performed by: INTERNAL MEDICINE

## 2018-01-13 PROCEDURE — 74011000250 HC RX REV CODE- 250: Performed by: INTERNAL MEDICINE

## 2018-01-13 PROCEDURE — 74011250636 HC RX REV CODE- 250/636: Performed by: HOSPITALIST

## 2018-01-13 PROCEDURE — 77030029684 HC NEB SM VOL KT MONA -A

## 2018-01-13 PROCEDURE — 74011250636 HC RX REV CODE- 250/636: Performed by: INTERNAL MEDICINE

## 2018-01-13 PROCEDURE — 74011250637 HC RX REV CODE- 250/637: Performed by: NURSE PRACTITIONER

## 2018-01-13 PROCEDURE — 94640 AIRWAY INHALATION TREATMENT: CPT

## 2018-01-13 RX ORDER — HYDROMORPHONE HYDROCHLORIDE 2 MG/ML
1 INJECTION, SOLUTION INTRAMUSCULAR; INTRAVENOUS; SUBCUTANEOUS
Status: DISCONTINUED | OUTPATIENT
Start: 2018-01-13 | End: 2018-01-14 | Stop reason: HOSPADM

## 2018-01-13 RX ADMIN — CARVEDILOL 25 MG: 12.5 TABLET, FILM COATED ORAL at 17:15

## 2018-01-13 RX ADMIN — DIGOXIN 0.12 MG: 125 TABLET ORAL at 08:07

## 2018-01-13 RX ADMIN — Medication 1 CAPSULE: at 08:08

## 2018-01-13 RX ADMIN — Medication 10 ML: at 17:17

## 2018-01-13 RX ADMIN — OXYCODONE HYDROCHLORIDE AND ACETAMINOPHEN 2 TABLET: 5; 325 TABLET ORAL at 18:08

## 2018-01-13 RX ADMIN — PANTOPRAZOLE SODIUM 40 MG: 40 TABLET, DELAYED RELEASE ORAL at 17:15

## 2018-01-13 RX ADMIN — PIPERACILLIN AND TAZOBACTAM 10.12 G: 3; .375 INJECTION, POWDER, FOR SOLUTION INTRAVENOUS at 17:17

## 2018-01-13 RX ADMIN — PANTOPRAZOLE SODIUM 40 MG: 40 TABLET, DELAYED RELEASE ORAL at 08:08

## 2018-01-13 RX ADMIN — Medication 10 ML: at 06:03

## 2018-01-13 RX ADMIN — DOCUSATE SODIUM 100 MG: 100 CAPSULE, LIQUID FILLED ORAL at 17:15

## 2018-01-13 RX ADMIN — IPRATROPIUM BROMIDE AND ALBUTEROL SULFATE 3 ML: .5; 3 SOLUTION RESPIRATORY (INHALATION) at 17:24

## 2018-01-13 RX ADMIN — DOCUSATE SODIUM 100 MG: 100 CAPSULE, LIQUID FILLED ORAL at 08:07

## 2018-01-13 RX ADMIN — CARVEDILOL 25 MG: 12.5 TABLET, FILM COATED ORAL at 08:07

## 2018-01-13 RX ADMIN — LEVOFLOXACIN 750 MG: 5 INJECTION, SOLUTION INTRAVENOUS at 03:38

## 2018-01-13 NOTE — PROGRESS NOTES
Hospitalist Progress Note  Garrett Hickey MD  Answering service: [phone] OR [phone] from in house phone  Cell: [phone]      Date of Service:  2018  NAME:  Lisbeth Posada  :  1945  MRN:  [id number]      Admission Summary:   68 yo male with PMHx of COPD, Hx of Left pleural effusion, Left lung mass, Atrial Fib, HTN, Hx of GI bleed s/p EGD 2017, taken off Asa/coumadin and Naproxen at that time. Pt states he had dark BMs before Luis Alfredo but they have resolved and have not recurred. Pt now admitted for worsening SOB over past 1 week that is worse with exertion. Pt denies any chest pain, fever, chills, cough, n/v, diarrhea, abdominal pain. Pt went to [facility] who transferred patient to [facility] for possible VATS. Interval history / Subjective:     Pt seen and examined  Feels well  No chest pain, sob, nv/d. Paradise Briscoe can I go home? \"     Assessment & Plan:     Recurrent Large Left Pleural effusion, r/o malignancy  -sec to left empyema  - hx of COPD , Ex-smoker  -s/p left chest tube  with purulent drainage  - s/p VATS   -culture shows microaerophilic streptococcous  -On Zosyn/LVQ  -CXR  Resolution right pneumothorax following chest tube placement.  -Appreciate Thoracic surgery/Pulmonary  - left chest tube out    Right pneumothorax  -noted   -CXR  Interval moderate right-sided pneumothorax  -s/p chest tube   -repeat CXR  Resolution right pneumothorax following chest tube placement  - right chest tube out    Hx of Lung Mass  - not seen clearly on CTA chest   - will need repeat CT at some point  - Thoracic surgery following    Atrial Fib with RVR  - On Coreg/Digoxin.  Per cardiology no need for cardizem  - not on Coumadin/ASA since 2017 due to GI Bleed   - Plan to start Eliquis next month  -Appreciate Cardiology input    Anemia, appears Chronic  - hb 8.2 at [facility]  - unclear if was lab error  - s/p 2U PRBCs  - recent GI work up included EGD which did not show active bleeding per patient  - patient is to have repeat EGD and Colon at some point as outpatient  - PPI BID     MARY  -Sec to dehydration vs med (Vanc)  -Will consider stopping Vanc if ok with Pulmonary  -Fluids started by cardiology 1/11, monitor for overload    Chronic systolic CHF  -not sure if the patient had acute on chronic CHF  -EF 30-35% with NO RWMA. Moderate to severe MR. Severe left atrium, right atrium dilatation  -Appreciate cardiology input    Pulmonary hypertension  -Will discuss with pulmonary tomorrow    COPD, chronic  - does not appear to have exacerbation  - d/c steroids  - Nebs PRN     Compressive Atelectasis  - no obvious symptoms of Pneumonia at this time    Code status: FULL  DVT prophylaxis: Brenda Lynch discussed with: Patient/Family, Nurse and Consultant Thoracic surgery  Disposition: Home w/Family     Hospital Problems  Date Reviewed: 1/8/2018          Codes Class Noted POA    * (Principal)Recurrent left pleural effusion ICD-10-CM: J90  ICD-9-CM: 511.9  1/8/2018 Yes                Review of Systems:   A comprehensive review of systems was negative except for that written in the HPI. Vital Signs:    Last 24hrs VS reviewed since prior progress note. Most recent are:  Visit Vitals    /63    Pulse (!) 112    Temp 98.1 °F (36.7 °C)    Resp 12    Ht 5' 8.11\" (1.73 m)    Wt 70.9 kg (156 lb 6.4 oz)    SpO2 95%    BMI 23.7 kg/m2         Intake/Output Summary (Last 24 hours) at 01/13/18 1240  Last data filed at 01/13/18 0400   Gross per 24 hour   Intake             2025 ml   Output             2170 ml   Net             -145 ml        Physical Examination:             Constitutional:  No acute distress, cooperative, pleasant    ENT:  Oral mucous moist,    Resp:  clear on Rt, diminished on left   CV:  Irregular     GI:  Soft, non distended, non tender.  normoactive bowel sounds    Musculoskeletal:  No edema, warm, 2+ pulses throughout    Neurologic:  Moves all extremities. AAOx3, RUE contracture-chronic      Skin:  Good turgor, no rashes or ulcers       Data Review:    Review and/or order of clinical lab test  Review and/or order of tests in the radiology section of CPT  Review and/or order of tests in the medicine section of CPT      Labs:     Recent Labs      01/12/18 0425  01/11/18   0241   WBC  13.1*  15.2*   HGB  8.8*  8.8*   HCT  28.6*  28.3*   PLT  431*  436*     Recent Labs      01/12/18   0425  01/11/18   0241   NA  140  140   K  4.3  4.4   CL  110*  109*   CO2  21  22   BUN  32*  36*   CREA  2.11*  2.42*   GLU  100  106*   CA  8.4*  8.2*   MG   --   1.9   PHOS   --   4.0     Recent Labs      01/11/18   0241   SGOT  8*   ALT  16   AP  150*   TBILI  0.6   TP  6.6   ALB  1.9*   GLOB  4.7*     No results for input(s): INR, PTP, APTT in the last 72 hours. No lab exists for component: INREXT, INREXT   No results for input(s): FE, TIBC, PSAT, FERR in the last 72 hours. Lab Results   Component Value Date/Time    Folate 26.6 01/08/2018 03:34 AM      No results for input(s): PH, PCO2, PO2 in the last 72 hours. No results for input(s): CPK, CKNDX, TROIQ in the last 72 hours.     No lab exists for component: CPKMB  No results found for: CHOL, CHOLX, CHLST, CHOLV, HDL, LDL, LDLC, DLDLP, TGLX, TRIGL, TRIGP, CHHD, CHHDX  Lab Results   Component Value Date/Time    Glucose (POC) 104 01/09/2018 08:20 AM     No results found for: COLOR, APPRN, SPGRU, REFSG, JEANNINE, PROTU, GLUCU, KETU, BILU, UROU, ANKUR, LEUKU, GLUKE, EPSU, BACTU, WBCU, RBCU, CASTS, UCRY      Medications Reviewed:     Current Facility-Administered Medications   Medication Dose Route Frequency    HYDROmorphone (PF) (DILAUDID) injection 1 mg  1 mg IntraVENous Q3H PRN    digoxin (LANOXIN) tablet 0.125 mg  0.125 mg Oral DAILY    senna (SENOKOT) tablet 8.6 mg  1 Tab Oral QHS    sodium chloride (NS) flush 5-10 mL  5-10 mL IntraVENous Q8H    sodium chloride (NS) flush 5-10 mL  5-10 mL IntraVENous PRN    oxyCODONE-acetaminophen (PERCOCET) 5-325 mg per tablet 2 Tab  2 Tab Oral Q4H PRN    levoFLOXacin (LEVAQUIN) 750 mg in D5W IVPB  750 mg IntraVENous Q48H    carvedilol (COREG) tablet 25 mg  25 mg Oral BID WITH MEALS    ondansetron (ZOFRAN) injection 4 mg  4 mg IntraVENous Q4H PRN    docusate sodium (COLACE) capsule 100 mg  100 mg Oral BID    albuterol-ipratropium (DUO-NEB) 2.5 MG-0.5 MG/3 ML  3 mL Nebulization Q4H PRN    lactobac ac& pc-s.therm-b.anim (SALLIE Q/RISAQUAD)  1 Cap Oral DAILY    pantoprazole (PROTONIX) tablet 40 mg  40 mg Oral ACB&D    piperacillin-tazobactam (ZOSYN) 10.125 g in  mL continuous 24 hr infusion  10.125 g IntraVENous Q24H     ______________________________________________________________________  EXPECTED LENGTH OF STAY: 10d 0h  ACTUAL LENGTH OF STAY:          Luci Andrew MD

## 2018-01-13 NOTE — PROGRESS NOTES
Report received from [facility]. 2100: dr Ginger Parker paged re: pt constipation discomfort and wet coarse lung sounds. 2120: dr Ginger Parker with call back. ducolax supp ordered. And cardio call for wet lung sounds ordered. 2125: dr Mohini Ramires,  paged re: wet coarse lung sounds. Lasix 40mg iv once ordered.

## 2018-01-13 NOTE — PROGRESS NOTES
Hospitalist Progress Note  Richard Tovar MD  Answering service: [phone] OR [phone] from in house phone  Cell: [phone]      Date of Service:  2018  NAME:  Genet Aguayo  :  1945  MRN:  [id number]      Admission Summary:   68 yo male with PMHx of COPD, Hx of Left pleural effusion, Left lung mass, Atrial Fib, HTN, Hx of GI bleed s/p EGD 2017, taken off Asa/coumadin and Naproxen at that time. Pt states he had dark BMs before [facility] but they have resolved and have not recurred. Pt now admitted for worsening SOB over past 1 week that is worse with exertion. Pt denies any chest pain, fever, chills, cough, n/v, diarrhea, abdominal pain. Pt went to [facility] who transferred patient to [facility] for possible VATS. Interval history / Subjective:     Pt seen and examined  Feels well  No chest pain, sob, nv/d. Assessment & Plan:     Recurrent Large Left Pleural effusion, r/o malignancy  -sec to left empyema  - hx of COPD , Ex-smoker  -s/p chest tube  with purulent drainage  - s/p VATS   -culture shows microaerophilic streptococcous  -On Zosyn/LVQ  -CXR  Resolution right pneumothorax following chest tube placement.  -Appreciate Thoracic surgery/Pulmonary    Right pneumothorax  -noted   -CXR  Interval moderate right-sided pneumothorax  -s/p chest tube   -repeat CXR  Resolution right pneumothorax following chest tube placement    Hx of Lung Mass  - not seen clearly on CTA chest   - will need repeat CT at some point  - Thoracic surgery following    Atrial Fib with RVR  - On Coreg/Digoxin.  Per cardiology no need for cardizem  - not on Coumadin/ASA since 2017 due to GI Bleed   - Plan to start Eliquis next month  -Appreciate Cardiology input    Anemia, appears Chronic  - hb 8.2 at [place]  - unclear if was lab error  - s/p 2U PRBCs  - recent GI work up included EGD which did not show active bleeding per patient  - patient is to have repeat EGD and Colon at some point as outpatient  - PPI BID     MARY  -Sec to dehydration vs med (Vanc)  -Will consider stopping Vanc if ok with Pulmonary  -Fluids started by cardiology 1/11, monitor for overload    Chronic systolic CHF  -not sure if the patient had acute on chronic CHF  -EF 30-35% with NO RWMA. Moderate to severe MR. Severe left atrium, right atrium dilatation  -Appreciate cardiology input    Pulmonary hypertension  -Will discuss with pulmonary tomorrow    COPD, chronic  - does not appear to have exacerbation  - d/c steroids  - Nebs PRN     Compressive Atelectasis  - no obvious symptoms of Pneumonia at this time    Code status: FULL  DVT prophylaxis: Brenda Lynch discussed with: Patient/Family, Nurse and Consultant Thoracic surgery  Disposition: Home w/Family     Hospital Problems  Date Reviewed: 1/8/2018          Codes Class Noted POA    * (Principal)Recurrent left pleural effusion ICD-10-CM: J90  ICD-9-CM: 511.9  1/8/2018 Yes                Review of Systems:   A comprehensive review of systems was negative except for that written in the HPI. Vital Signs:    Last 24hrs VS reviewed since prior progress note. Most recent are:  Visit Vitals    BP (!) 145/97    Pulse (!) 124    Temp 97.9 °F (36.6 °C)    Resp 24    Ht 5' 8.11\" (1.73 m)    Wt 70.5 kg (155 lb 8 oz)    SpO2 100%    BMI 23.57 kg/m2         Intake/Output Summary (Last 24 hours) at 01/12/18 2112  Last data filed at 01/12/18 2000   Gross per 24 hour   Intake             3000 ml   Output             1690 ml   Net             1310 ml        Physical Examination:             Constitutional:  No acute distress, cooperative, pleasant    ENT:  Oral mucous moist,    Resp:  clear on Rt, diminished on left   CV:  Irregular     GI:  Soft, non distended, non tender. normoactive bowel sounds    Musculoskeletal:  No edema, warm, 2+ pulses throughout    Neurologic:  Moves all extremities. AAOx3, RUE contracture-chronic      Skin:  Good turgor, no rashes or ulcers       Data Review:    Review and/or order of clinical lab test  Review and/or order of tests in the radiology section of CPT  Review and/or order of tests in the medicine section of CPT      Labs:     Recent Labs      01/12/18 0425 01/11/18 0241   WBC  13.1*  15.2*   HGB  8.8*  8.8*   HCT  28.6*  28.3*   PLT  431*  436*     Recent Labs      01/12/18   0425  01/11/18   0241  01/10/18   0515   NA  140  140  140   K  4.3  4.4  4.5   CL  110*  109*  110*   CO2  21  22  21   BUN  32*  36*  32*   CREA  2.11*  2.42*  2.17*   GLU  100  106*  107*   CA  8.4*  8.2*  8.4*   MG   --   1.9   --    PHOS   --   4.0   --      Recent Labs      01/11/18 0241   SGOT  8*   ALT  16   AP  150*   TBILI  0.6   TP  6.6   ALB  1.9*   GLOB  4.7*     No results for input(s): INR, PTP, APTT in the last 72 hours. No lab exists for component: INREXT, INREXT   No results for input(s): FE, TIBC, PSAT, FERR in the last 72 hours. Lab Results   Component Value Date/Time    Folate 26.6 01/08/2018 03:34 AM      No results for input(s): PH, PCO2, PO2 in the last 72 hours. No results for input(s): CPK, CKNDX, TROIQ in the last 72 hours.     No lab exists for component: CPKMB  No results found for: CHOL, CHOLX, CHLST, CHOLV, HDL, LDL, LDLC, DLDLP, TGLX, TRIGL, TRIGP, CHHD, CHHDX  Lab Results   Component Value Date/Time    Glucose (POC) 104 01/09/2018 08:20 AM     No results found for: COLOR, APPRN, SPGRU, REFSG, JEANNINE, PROTU, GLUCU, KETU, BILU, UROU, ANKUR, LEUKU, GLUKE, EPSU, BACTU, WBCU, RBCU, CASTS, UCRY      Medications Reviewed:     Current Facility-Administered Medications   Medication Dose Route Frequency    digoxin (LANOXIN) tablet 0.125 mg  0.125 mg Oral DAILY    senna (SENOKOT) tablet 8.6 mg  1 Tab Oral QHS    sodium chloride (NS) flush 5-10 mL  5-10 mL IntraVENous Q8H    sodium chloride (NS) flush 5-10 mL  5-10 mL IntraVENous PRN    oxyCODONE-acetaminophen (PERCOCET) 5-325 mg per tablet 2 Tab  2 Tab Oral Q4H PRN    HYDROmorphone (PF) 15 mg/30 ml (DILAUDID) PCA   IntraVENous CONTINUOUS    levoFLOXacin (LEVAQUIN) 750 mg in D5W IVPB  750 mg IntraVENous Q48H    carvedilol (COREG) tablet 25 mg  25 mg Oral BID WITH MEALS    ondansetron (ZOFRAN) injection 4 mg  4 mg IntraVENous Q4H PRN    docusate sodium (COLACE) capsule 100 mg  100 mg Oral BID    albuterol-ipratropium (DUO-NEB) 2.5 MG-0.5 MG/3 ML  3 mL Nebulization Q4H PRN    lactobac ac& pc-s.therm-b.anim (SALLIE Q/RISAQUAD)  1 Cap Oral DAILY    pantoprazole (PROTONIX) tablet 40 mg  40 mg Oral ACB&D    piperacillin-tazobactam (ZOSYN) 10.125 g in  mL continuous 24 hr infusion  10.125 g IntraVENous Q24H     ______________________________________________________________________  EXPECTED LENGTH OF STAY: 10d 0h  ACTUAL LENGTH OF STAY:          Imer Pereira MD

## 2018-01-13 NOTE — PROGRESS NOTES
Mr. Lee Lizarraga is POD#4 after a Left VATS, decortication. He had a pigtail placed yesterday for an unrelated right pneumothorax. No acute events overnight. Afebrile  HD stable, better rate control for his Afib    CT no air leak, <100ml output    On exam he is seated upright in bed  Alert and oriented  Coarse BS b/l  Incisions well healing    CXR- right pneumothorax resolved, stable left chest    Mr. Lee Lizarraga is progressing well. Will remove CTs today. Repeat CXR. Will stop PCA. Continue rehab efforts.

## 2018-01-13 NOTE — PROGRESS NOTES
Problem: Falls - Risk of  Goal: *Absence of Falls  Document Beau Fall Risk and appropriate interventions in the flowsheet.    Outcome: Progressing Towards Goal  Fall Risk Interventions:  Mobility Interventions: Patient to call before getting OOB         Medication Interventions: Patient to call before getting OOB    Elimination Interventions: Call light in reach, Patient to call for help with toileting needs

## 2018-01-13 NOTE — PROGRESS NOTES
Pulmonary / [place] 47 Day: 7     S:    Feels well  Chest tubes out today     O:    Patient Vitals for the past 4 hrs:   BP Temp Pulse Resp SpO2   18 0900 113/72 - (!) 122 11 99 %   18 0848 - - - - 96 %   18 0800 117/81 98.1 °F (36.7 °C) (!) 122 17 95 %   18 0700 106/73 - (!) 121 19 100 %   18 0600 104/71 - (!) 105 13 98 %     Temp (24hrs), Av.2 °F (36.8 °C), Min:97.9 °F (36.6 °C), Max:98.6 °F (37 °C)      Intake/Output Summary (Last 24 hours) at 18 0940  Last data filed at 18 0400   Gross per 24 hour   Intake             2250 ml   Output             2680 ml   Net             -430 ml     Exam:     General:  Well developed, No acute distress   HEENT:  Sclera anicteric, Mucous membranes moist   Neck:  No accessory muscle use, No JVD, Supple   Chest:  Symmetrical in expansion, L chest tube, R sided pigtail catheter   Lungs:  Decreased BS on L   Heart:  Irreg   Abdomen:  Soft, ND / NT, Bowel sounds present   Extremities:  No cyanosis, No clubbing, No edema   Skin:  Warm / Dry   Neuro:  Alert, Grossly non-focal      Imaging: Images independently viewed  CXR - L chest tube and new R moderate PTX    CXR post R pigtail cather placement - re-expansion of R lung    Labs:  Lab:  Recent Labs      18   0425  18   0241   WBC  13.1*  15.2*   HGB  8.8*  8.8*   PLT  431*  436*   NA  140  140   K  4.3  4.4   CL  110*  109*   CO2  21  22   BUN  32*  36*   CREA  2.11*  2.42*   GLU  100  106*   CA  8.4*  8.2*   MG   --   1.9   PHOS   --   4.0   TBILI   --   0.6   SGOT   --   8*         A/P:   Left pleural effusion - empyema  2018 - Procedure(s):  LEFT VIDEO ASSISTED THOROCOSCOPY, MINI AXILLARY THOROCOTOMY, FULL PULMONARY DECORTICATION   Microaerophillic strep species in fluid      Anemia  S/p transfusion      H/o afib  Reduced LV EF  On dig  Cardiology following    Spontaneous R sided PTX s/p pigtail catheter     --continue zosyn and vanc - follow up final id and sensitivities from state lab  --afib per cardiology  --Chest tubes per thoracic surgery.   Out today    Can transfer out of icu    Finn Pedro MD

## 2018-01-14 VITALS
HEART RATE: 101 BPM | DIASTOLIC BLOOD PRESSURE: 75 MMHG | RESPIRATION RATE: 18 BRPM | OXYGEN SATURATION: 97 % | TEMPERATURE: 98.6 F | WEIGHT: 150.4 LBS | BODY MASS INDEX: 22.79 KG/M2 | SYSTOLIC BLOOD PRESSURE: 121 MMHG | HEIGHT: 68 IN

## 2018-01-14 LAB
ANION GAP SERPL CALC-SCNC: 6 MMOL/L (ref 5–15)
BASOPHILS # BLD: 0 K/UL (ref 0–0.1)
BASOPHILS NFR BLD: 0 % (ref 0–1)
BUN SERPL-MCNC: 25 MG/DL (ref 6–20)
BUN/CREAT SERPL: 14 (ref 12–20)
CALCIUM SERPL-MCNC: 7.9 MG/DL (ref 8.5–10.1)
CHLORIDE SERPL-SCNC: 110 MMOL/L (ref 97–108)
CO2 SERPL-SCNC: 25 MMOL/L (ref 21–32)
CREAT SERPL-MCNC: 1.79 MG/DL (ref 0.7–1.3)
EOSINOPHIL # BLD: 0.3 K/UL (ref 0–0.4)
EOSINOPHIL NFR BLD: 2 % (ref 0–7)
ERYTHROCYTE [DISTWIDTH] IN BLOOD BY AUTOMATED COUNT: 19.1 % (ref 11.5–14.5)
GLUCOSE SERPL-MCNC: 89 MG/DL (ref 65–100)
HCT VFR BLD AUTO: 28.1 % (ref 36.6–50.3)
HGB BLD-MCNC: 8.6 G/DL (ref 12.1–17)
LYMPHOCYTES # BLD: 1.5 K/UL (ref 0.8–3.5)
LYMPHOCYTES NFR BLD: 11 % (ref 12–49)
MCH RBC QN AUTO: 26.2 PG (ref 26–34)
MCHC RBC AUTO-ENTMCNC: 30.6 G/DL (ref 30–36.5)
MCV RBC AUTO: 85.7 FL (ref 80–99)
MONOCYTES # BLD: 0.9 K/UL (ref 0–1)
MONOCYTES NFR BLD: 7 % (ref 5–13)
NEUTS SEG # BLD: 10.4 K/UL (ref 1.8–8)
NEUTS SEG NFR BLD: 80 % (ref 32–75)
PLATELET # BLD AUTO: 370 K/UL (ref 150–400)
POTASSIUM SERPL-SCNC: 4 MMOL/L (ref 3.5–5.1)
RBC # BLD AUTO: 3.28 M/UL (ref 4.1–5.7)
SODIUM SERPL-SCNC: 141 MMOL/L (ref 136–145)
WBC # BLD AUTO: 13 K/UL (ref 4.1–11.1)

## 2018-01-14 PROCEDURE — 80048 BASIC METABOLIC PNL TOTAL CA: CPT | Performed by: HOSPITALIST

## 2018-01-14 PROCEDURE — 85025 COMPLETE CBC W/AUTO DIFF WBC: CPT | Performed by: HOSPITALIST

## 2018-01-14 PROCEDURE — 94640 AIRWAY INHALATION TREATMENT: CPT

## 2018-01-14 PROCEDURE — 36415 COLL VENOUS BLD VENIPUNCTURE: CPT | Performed by: HOSPITALIST

## 2018-01-14 PROCEDURE — 74011250637 HC RX REV CODE- 250/637: Performed by: INTERNAL MEDICINE

## 2018-01-14 PROCEDURE — 74011250637 HC RX REV CODE- 250/637: Performed by: NURSE PRACTITIONER

## 2018-01-14 PROCEDURE — 74011000250 HC RX REV CODE- 250: Performed by: INTERNAL MEDICINE

## 2018-01-14 RX ORDER — DIGOXIN 250 MCG
0.12 TABLET ORAL DAILY
Qty: 30 TAB | Refills: 0 | Status: SHIPPED | OUTPATIENT
Start: 2018-01-14

## 2018-01-14 RX ORDER — AMOXICILLIN AND CLAVULANATE POTASSIUM 875; 125 MG/1; MG/1
1 TABLET, FILM COATED ORAL EVERY 12 HOURS
Qty: 14 TAB | Refills: 0 | Status: SHIPPED | OUTPATIENT
Start: 2018-01-14

## 2018-01-14 RX ORDER — PANTOPRAZOLE SODIUM 40 MG/1
40 TABLET, DELAYED RELEASE ORAL
Qty: 60 TAB | Refills: 0 | Status: SHIPPED | OUTPATIENT
Start: 2018-01-14

## 2018-01-14 RX ORDER — OXYCODONE AND ACETAMINOPHEN 5; 325 MG/1; MG/1
2 TABLET ORAL
Qty: 20 TAB | Refills: 0 | Status: SHIPPED | OUTPATIENT
Start: 2018-01-14

## 2018-01-14 RX ADMIN — Medication 10 ML: at 05:59

## 2018-01-14 RX ADMIN — Medication 1 CAPSULE: at 09:16

## 2018-01-14 RX ADMIN — PANTOPRAZOLE SODIUM 40 MG: 40 TABLET, DELAYED RELEASE ORAL at 09:16

## 2018-01-14 RX ADMIN — DIGOXIN 0.12 MG: 125 TABLET ORAL at 09:17

## 2018-01-14 RX ADMIN — IPRATROPIUM BROMIDE AND ALBUTEROL SULFATE 3 ML: .5; 3 SOLUTION RESPIRATORY (INHALATION) at 13:58

## 2018-01-14 RX ADMIN — Medication 10 ML: at 00:32

## 2018-01-14 RX ADMIN — CARVEDILOL 25 MG: 12.5 TABLET, FILM COATED ORAL at 09:16

## 2018-01-14 NOTE — PROGRESS NOTES
Bedside shift change report given to [facility] DINORAH (oncoming nurse) by Cassie Turner (offgoing nurse). Report included the following information SBAR.     1700- AVS reviewed and signed with wife and patient. Discussed meds, activity restrictions, diet, f/u appt, etc. Peripheral IV d/c'd with catheter intact. Patient left with personal belongings ( cell phone, clothes, etc).

## 2018-01-14 NOTE — DISCHARGE SUMMARY
Discharge Summary       PATIENT ID: Kellie Freed  MRN: [id number]   YOB: 1945    DATE OF ADMISSION: 1/7/2018 10:19 PM    DATE OF DISCHARGE: 1/14/2018  PRIMARY CARE PROVIDER: None     ATTENDING PHYSICIAN: Dr Katherine Cortes  DISCHARGING PROVIDER: Katherine Cortes MD    To contact this individual call [phone] and ask the  to page. If unavailable ask to be transferred the Adult Hospitalist Department. CONSULTATIONS: IP CONSULT TO THORACIC SURGERY  IP CONSULT TO GASTROENTEROLOGY    PROCEDURES/SURGERIES: Procedure(s):  LEFT VIDEO ASSISTED THOROCOSCOPY, MINI AXILLARY THOROCOTOMY, FULL PULMONARY DECORTICATION    ADMITTING DIAGNOSES & HOSPITAL COURSE:   Recurrent Large Left Pleural effusion, r/o malignancy  -sec to left empyema  - hx of COPD , Ex-smoker  -s/p left chest tube 1/8 with purulent drainage  - s/p VATS 1/9  -culture shows microaerophilic streptococcous  -On Zosyn/LVQ  -CXR 1/12 Resolution right pneumothorax following chest tube placement.  -Appreciate Thoracic surgery/Pulmonary  -1/13 left chest tube out  -Cleared by thoracic surgery for discharge today.   -Counseled the patient to stay here for close monitoring for a day but he wants to get discharged today. Will discharge with close follow up with Thoracic surgery and PMD as outpatient    Right pneumothorax  -noted 1/12  -CXR 1/12 Interval moderate right-sided pneumothorax  -s/p chest tube 1/12  -repeat CXR 1/12 Resolution right pneumothorax following chest tube placement  -1/13 right chest tube out    Hx of Lung Mass  - not seen clearly on CTA chest   - will need repeat CT at some point  - Thoracic surgery following, outpatient follow up    Atrial Fib with RVR  - On Coreg/Digoxin.  Per cardiology no need for cardizem  - not on Coumadin/ASA since 12/2017 due to GI Bleed   - Plan to start Eliquis next month  -Appreciate Cardiology input, still his heart rate is higher but cardiology is fine with it    Anemia, appears Chronic  - hb 8.2 at [facility]  - unclear if was lab error  - s/p 2U PRBCs  - recent GI work up included EGD which did not show active bleeding per patient  - patient is to have repeat EGD and Colon at some point as outpatient  - PPI BID     MARY  -Sec to dehydration vs med (Vanc)  -Will consider stopping Vanc if ok with Pulmonary  -Fluids started by cardiology 1/11, monitor for overload    Chronic systolic CHF  -not sure if the patient had acute on chronic CHF  -EF 30-35% with NO RWMA. Moderate to severe MR. Severe left atrium, right atrium dilatation  -Appreciate cardiology input    Pulmonary hypertension  -Outpatient follow up    COPD, chronic  - does not appear to have exacerbation  - d/c steroids  - Nebs PRN     Compressive Atelectasis  - no obvious symptoms of Pneumonia at this time    PT/OT home    Code status: FULL  DVT prophylaxis: SCDs    High risk of readmission        DISCHARGE DIAGNOSES / PLAN:      1. Recurrent pleural effusion  2. A fib with RVR       PENDING TEST RESULTS:   At the time of discharge the following test results are still pending: none    FOLLOW UP APPOINTMENTS:    Follow-up Information     Follow up With Details Comments Contact Info    PCP In 4 days      Eve Heart MD In 2 weeks  [address] [place] [address], [address] [place] [address]  [phone]      Gastroenterologist In 2 weeks      Belen Cason MD In 1 week  [address] [place] [address]  [phone]             ADDITIONAL CARE RECOMMENDATIONS:   Follow up with your PMD, thoracic surgery, Gastroenterologist as well as your cardiologist  Your heart rate is still high, if goes higher than 130 please call your cardiologist  Your gastroenterologist will let your know about restarting your ASA as well as coumadin    DIET: Cardiac Diet    ACTIVITY: avoid heavy lifting for 4 weeks and avoid driving until you return to thoracic surgery clinic.       WOUND CARE: Leave CT bandage in place for 48 hours then safe to resume showering. DISCHARGE MEDICATIONS:  Current Discharge Medication List      START taking these medications    Details   L. acidoph & paracasei- S therm- Bifido (SALLIE-Q/RISAQUAD) 8 billion cell cap cap Take 1 Cap by mouth daily. Qty: 10 Cap, Refills: 0      oxyCODONE-acetaminophen (PERCOCET) 5-325 mg per tablet Take 2 Tabs by mouth every four (4) hours as needed. Max Daily Amount: 12 Tabs. Qty: 20 Tab, Refills: 0    Associated Diagnoses: Recurrent left pleural effusion      pantoprazole (PROTONIX) 40 mg tablet Take 1 Tab by mouth Before breakfast and dinner. Qty: 60 Tab, Refills: 0      amoxicillin-clavulanate (AUGMENTIN) 875-125 mg per tablet Take 1 Tab by mouth every twelve (12) hours. Qty: 14 Tab, Refills: 0         CONTINUE these medications which have CHANGED    Details   digoxin (DIGITEK) 0.25 mg tablet Take 0.5 Tabs by mouth daily. Qty: 30 Tab, Refills: 0         CONTINUE these medications which have NOT CHANGED    Details   carvedilol (COREG) 25 mg tablet Take 25 mg by mouth two (2) times daily (with meals). STOP taking these medications       aspirin delayed-release 81 mg tablet Comments:   Reason for Stopping:         naproxen (NAPROSYN) 500 mg tablet Comments:   Reason for Stopping:         warfarin (COUMADIN) 5 mg tablet Comments:   Reason for Stopping:                 NOTIFY YOUR PHYSICIAN FOR ANY OF THE FOLLOWING:   Fever over 101 degrees for 24 hours. Chest pain, shortness of breath, fever, chills, nausea, vomiting, diarrhea, change in mentation, falling, weakness, bleeding. Severe pain or pain not relieved by medications. Or, any other signs or symptoms that you may have questions about.     DISPOSITION:  x  Home With:   OT  PT  HH  RN       Long term SNF/Inpatient Rehab    Independent/assisted living    Hospice    Other:       PATIENT CONDITION AT DISCHARGE:     Functional status    Poor     Deconditioned    x Independent      Cognition    x Lucid     Forgetful     Dementia Catheters/lines (plus indication)    Alvarez     PICC     PEG    x None      Code status    x Full code     DNR      PHYSICAL EXAMINATION AT DISCHARGE:  Please see progress note      CHRONIC MEDICAL DIAGNOSES:  Problem List as of 1/14/2018  Date Reviewed: 1/8/2018          Codes Class Noted - Resolved    * (Principal)Recurrent left pleural effusion ICD-10-CM: J90  ICD-9-CM: 511.9  1/8/2018 - Present              Greater than 41 minutes were spent with the patient on counseling and coordination of care    Signed:   Elio Conde MD  1/14/2018  1:58 PM

## 2018-01-14 NOTE — PROGRESS NOTES
Hospitalist Progress Note  Isac Castellon MD  Answering service: [phone] OR [phone] from in house phone  Cell: [phone]      Date of Service:  2018  NAME:  Benjamin Montano  :  1945  MRN:  [id number]      Admission Summary:   68 yo male with PMHx of COPD, Hx of Left pleural effusion, Left lung mass, Atrial Fib, HTN, Hx of GI bleed s/p EGD 2017, taken off Asa/coumadin and Naproxen at that time. Pt states he had dark BMs before [place] but they have resolved and have not recurred. Pt now admitted for worsening SOB over past 1 week that is worse with exertion. Pt denies any chest pain, fever, chills, cough, n/v, diarrhea, abdominal pain. Pt went to [facility] who transferred patient to [facility] for possible VATS. Interval history / Subjective:     Pt seen and examined  Feels well  No chest pain, sob, nv/d. \"I want to go home today\"     Assessment & Plan:     Recurrent Large Left Pleural effusion, r/o malignancy  -sec to left empyema  - hx of COPD , Ex-smoker  -s/p left chest tube  with purulent drainage  - s/p VATS   -culture shows microaerophilic streptococcous  -On Zosyn/LVQ  -CXR  Resolution right pneumothorax following chest tube placement.  -Appreciate Thoracic surgery/Pulmonary  - left chest tube out  -Cleared by thoracic surgery for discharge today.   -Counseled the patient to stay here for close monitoring for a day but he wants to get discharged today.  Will discharge with close follow up with Thoracic surgery and PMD as outpatient    Right pneumothorax  -noted   -CXR  Interval moderate right-sided pneumothorax  -s/p chest tube   -repeat CXR  Resolution right pneumothorax following chest tube placement  - right chest tube out    Hx of Lung Mass  - not seen clearly on CTA chest   - will need repeat CT at some point  - Thoracic surgery following, outpatient follow up    Atrial Fib with RVR  - On Coreg/Digoxin. Per cardiology no need for cardizem  - not on Coumadin/ASA since 12/2017 due to GI Bleed   - Plan to start Eliquis next month  -Appreciate Cardiology input, still his heart rate is higher but cardiology is fine with it    Anemia, appears Chronic  - hb 8.2 at [facility]  - unclear if was lab error  - s/p 2U PRBCs  - recent GI work up included EGD which did not show active bleeding per patient  - patient is to have repeat EGD and Colon at some point as outpatient  - PPI BID     MARY  -Sec to dehydration vs med (Vanc)  -Will consider stopping Vanc if ok with Pulmonary  -Fluids started by cardiology 1/11, monitor for overload    Chronic systolic CHF  -not sure if the patient had acute on chronic CHF  -EF 30-35% with NO RWMA. Moderate to severe MR. Severe left atrium, right atrium dilatation  -Appreciate cardiology input    Pulmonary hypertension  -Outpatient follow up    COPD, chronic  - does not appear to have exacerbation  - d/c steroids  - Nebs PRN     Compressive Atelectasis  - no obvious symptoms of Pneumonia at this time    PT/OT home    Code status: FULL  DVT prophylaxis: SCDs    Plan: Discharge to home today    Care Plan discussed with: Patient/Family, Nurse and Consultant Thoracic surgery  Disposition: Home w/Family     Hospital Problems  Date Reviewed: 1/8/2018          Codes Class Noted POA    * (Principal)Recurrent left pleural effusion ICD-10-CM: J90  ICD-9-CM: 511.9  1/8/2018 Yes                Review of Systems:   A comprehensive review of systems was negative except for that written in the HPI. Vital Signs:    Last 24hrs VS reviewed since prior progress note.  Most recent are:  Visit Vitals    BP (P) 126/84 (BP 1 Location: Left arm, BP Patient Position: At rest)    Pulse (!) (P) 111    Temp (P) 97.8 °F (36.6 °C)    Resp (P) 18    Ht 5' 8.11\" (1.73 m)    Wt 68.2 kg (150 lb 6.4 oz)    SpO2 (P) 98%    BMI 22.79 kg/m2         Intake/Output Summary (Last 24 hours) at 01/14/18 1348  Last data filed at 01/14/18 0900   Gross per 24 hour   Intake              740 ml   Output              400 ml   Net              340 ml        Physical Examination:             Constitutional:  No acute distress, cooperative, pleasant    ENT:  Oral mucous moist,    Resp:  clear on Rt, diminished on left   CV:  Irregular     GI:  Soft, non distended, non tender. normoactive bowel sounds    Musculoskeletal:  No edema, warm, 2+ pulses throughout    Neurologic:  Moves all extremities. AAOx3, RUE contracture-chronic      Skin:  Good turgor, no rashes or ulcers       Data Review:    Review and/or order of clinical lab test  Review and/or order of tests in the radiology section of CPT  Review and/or order of tests in the medicine section of CPT      Labs:     Recent Labs      01/14/18 0206 01/12/18 0425   WBC  13.0*  13.1*   HGB  8.6*  8.8*   HCT  28.1*  28.6*   PLT  370  431*     Recent Labs      01/14/18 0206 01/12/18 0425   NA  141  140   K  4.0  4.3   CL  110*  110*   CO2  25  21   BUN  25*  32*   CREA  1.79*  2.11*   GLU  89  100   CA  7.9*  8.4*     No results for input(s): SGOT, GPT, ALT, AP, TBIL, TBILI, TP, ALB, GLOB, GGT, AML, LPSE in the last 72 hours. No lab exists for component: AMYP, HLPSE  No results for input(s): INR, PTP, APTT in the last 72 hours. No lab exists for component: INREXT, INREXT   No results for input(s): FE, TIBC, PSAT, FERR in the last 72 hours. Lab Results   Component Value Date/Time    Folate 26.6 01/08/2018 03:34 AM      No results for input(s): PH, PCO2, PO2 in the last 72 hours. No results for input(s): CPK, CKNDX, TROIQ in the last 72 hours.     No lab exists for component: CPKMB  No results found for: CHOL, CHOLX, CHLST, CHOLV, HDL, LDL, LDLC, DLDLP, TGLX, TRIGL, TRIGP, CHHD, CHHDX  Lab Results   Component Value Date/Time    Glucose (POC) 104 01/09/2018 08:20 AM     No results found for: COLOR, APPRN, SPGRU, REFSG, JEANNINE, PROTU, Irean Means, BILU, UROU, ANKUR, LEUKU, GLUKE, EPSU, BACTU, WBCU, RBCU, CASTS, UCRY      Medications Reviewed:     Current Facility-Administered Medications   Medication Dose Route Frequency    HYDROmorphone (PF) (DILAUDID) injection 1 mg  1 mg IntraVENous Q3H PRN    digoxin (LANOXIN) tablet 0.125 mg  0.125 mg Oral DAILY    senna (SENOKOT) tablet 8.6 mg  1 Tab Oral QHS    sodium chloride (NS) flush 5-10 mL  5-10 mL IntraVENous Q8H    sodium chloride (NS) flush 5-10 mL  5-10 mL IntraVENous PRN    oxyCODONE-acetaminophen (PERCOCET) 5-325 mg per tablet 2 Tab  2 Tab Oral Q4H PRN    levoFLOXacin (LEVAQUIN) 750 mg in D5W IVPB  750 mg IntraVENous Q48H    carvedilol (COREG) tablet 25 mg  25 mg Oral BID WITH MEALS    ondansetron (ZOFRAN) injection 4 mg  4 mg IntraVENous Q4H PRN    docusate sodium (COLACE) capsule 100 mg  100 mg Oral BID    albuterol-ipratropium (DUO-NEB) 2.5 MG-0.5 MG/3 ML  3 mL Nebulization Q4H PRN    lactobac ac& pc-s.therm-b.anim (SALLIE Q/RISAQUAD)  1 Cap Oral DAILY    pantoprazole (PROTONIX) tablet 40 mg  40 mg Oral ACB&D    piperacillin-tazobactam (ZOSYN) 10.125 g in  mL continuous 24 hr infusion  10.125 g IntraVENous Q24H     ______________________________________________________________________  EXPECTED LENGTH OF STAY: 10d 0h  ACTUAL LENGTH OF STAY:          Abby Mesa MD

## 2018-01-14 NOTE — PROGRESS NOTES
Mr. Julissa Crockett is POD#5 after a Left VATS, decortication. All CTs removed yesterday    Afebrile  HD stable, better rate control for his Afib       On exam he is seated upright in bed  Alert and oriented  Coarse BS b/l  Incisions well healing     CXR- right pneumothorax resolved, stable left chest     Mr. Julissa Crockett is progressing well. Stable for discharge from a Thoracic Surgery standpoint. He should avoid heavy lifting for 4 weeks and avoid driving until he returns to clinic. Leave CT bandage in place for 48 hours tehn safe ly resume showering. Follow up in [facility] in 2 weeks [facility] [place], [place], [phone]].

## 2018-01-14 NOTE — PROGRESS NOTES
TRANSFER - OUT REPORT:    Verbal report given to on John Diaz  being transferred to (unit) for routine progression of care       Report consisted of patients Situation, Background, Assessment and   Recommendations(SBAR). Information from the following report(s) SBAR, Kardex, Procedure Summary, Intake/Output, MAR and Recent Results was reviewed with the receiving nurse. Lines:   Peripheral IV 01/11/18 Left Wrist (Active)   Site Assessment Clean, dry, & intact 1/14/2018 12:00 PM   Phlebitis Assessment 0 1/14/2018 12:00 PM   Infiltration Assessment 0 1/14/2018 12:00 PM   Dressing Status Clean, dry, & intact 1/14/2018 12:00 PM   Dressing Type Transparent;Tape 1/14/2018 12:00 PM   Hub Color/Line Status Blue; Infusing 1/14/2018 12:00 PM   Action Taken Open ports on tubing capped 1/14/2018 12:00 PM   Alcohol Cap Used Yes 1/14/2018 12:00 PM        Opportunity for questions and clarification was provided.       Patient transported with:   Registered Nurse  Tech

## 2018-01-14 NOTE — PROGRESS NOTES
Problem: Falls - Risk of  Goal: *Absence of Falls  Document Beau Fall Risk and appropriate interventions in the flowsheet.    Outcome: Progressing Towards Goal  Fall Risk Interventions:  Mobility Interventions: Communicate number of staff needed for ambulation/transfer         Medication Interventions: Patient to call before getting OOB    Elimination Interventions: Call light in reach

## 2018-01-14 NOTE — PROGRESS NOTES
Pulmonary / [place] 47 Day: 8     S:    Feels well  Chest tubes out today     O:    Patient Vitals for the past 4 hrs:   BP Temp Pulse Resp SpO2   18 0900 112/70 - (!) 111 17 98 %   18 0836 - - - - 95 %   18 0800 113/69 98.1 °F (36.7 °C) (!) 124 18 97 %     Temp (24hrs), Av °F (36.7 °C), Min:97.8 °F (36.6 °C), Max:98.2 °F (36.8 °C)      Intake/Output Summary (Last 24 hours) at 18 1106  Last data filed at 18 0800   Gross per 24 hour   Intake              500 ml   Output              150 ml   Net              350 ml     Exam:     General:  Well developed, No acute distress   HEENT:  Sclera anicteric, Mucous membranes moist   Neck:  No accessory muscle use, No JVD, Supple   Chest:  Symmetrical in expansion, L chest tube, R sided pigtail catheter   Lungs:  Decreased BS on L   Heart:  Irreg   Abdomen:  Soft, ND / NT, Bowel sounds present   Extremities:  No cyanosis, No clubbing, No edema   Skin:  Warm / Dry   Neuro:  Alert, Grossly non-focal      Imaging: Images independently viewed  CXR - L chest tube and new R moderate PTX    CXR post R pigtail cather placement - re-expansion of R lung    Labs:  Lab:  Recent Labs      18   0206  18   0425   WBC  13.0*  13.1*   HGB  8.6*  8.8*   PLT  370  431*   NA  141  140   K  4.0  4.3   CL  110*  110*   CO2  25  21   BUN  25*  32*   CREA  1.79*  2.11*   GLU  89  100   CA  7.9*  8.4*         A/P:   Left pleural effusion - empyema  2018 - Procedure(s):  LEFT VIDEO ASSISTED THOROCOSCOPY, MINI AXILLARY THOROCOTOMY, FULL PULMONARY DECORTICATION   Microaerophillic strep species in fluid      Anemia  S/p transfusion      H/o afib  Reduced LV EF  On dig  Cardiology following    Spontaneous R sided PTX s/p pigtail catheter     --on abx  --afib per cardiology  --Chest tubes out  --d/c planning per primary team    Ruth Martines MD

## 2018-01-14 NOTE — DISCHARGE INSTRUCTIONS
Discharge Instructions       PATIENT ID: Duong Bar  MRN: [id number]   YOB: 1945    DATE OF ADMISSION: 1/7/2018 10:19 PM    DATE OF DISCHARGE: 1/14/2018    PRIMARY CARE PROVIDER: None     ATTENDING PHYSICIAN: Kristyn Taylor MD  DISCHARGING PROVIDER: Kristyn Taylor MD    To contact this individual call [phone] and ask the  to page. If unavailable ask to be transferred the Adult Hospitalist Department. DISCHARGE DIAGNOSES   Pleural effusion    CONSULTATIONS: IP CONSULT TO THORACIC SURGERY  IP CONSULT TO GASTROENTEROLOGY    PROCEDURES/SURGERIES: Procedure(s):  LEFT VIDEO ASSISTED THOROCOSCOPY, MINI AXILLARY THOROCOTOMY, FULL PULMONARY DECORTICATION    PENDING TEST RESULTS:   At the time of discharge the following test results are still pending: none    FOLLOW UP APPOINTMENTS:   Follow-up Information     Follow up With Details Comments Contact Info    PCP In 4 days      Veena Lucero MD In 2 weeks  [address] [place] [address], [address] [place] [address]  [phone]      Gastroenterologist In 2 weeks      Gabriel English MD In 1 week  [address] [place] [address]  [phone]             ADDITIONAL CARE RECOMMENDATIONS:   Follow up with your PMD, thoracic surgery, Gastroenterologist as well as your cardiologist  Your heart rate is still high, if goes higher than 130 please call your cardiologist  Your gastroenterologist will let your know about restarting your ASA as well as coumadin  Repeat CXR in 4 weeks    DIET: Cardiac Diet    ACTIVITY: avoid heavy lifting for 4 weeks and avoid driving until you return to thoracic surgery clinic. WOUND CARE: Leave CT bandage in place for 48 hours then safe to resume showering. DISCHARGE MEDICATIONS:   See Medication Reconciliation Form    · It is important that you take the medication exactly as they are prescribed.    · Keep your medication in the bottles provided by the pharmacist and keep a list of the medication names, dosages, and times to be taken in your wallet. · Do not take other medications without consulting your doctor. NOTIFY YOUR PHYSICIAN FOR ANY OF THE FOLLOWING:   Fever over 101 degrees for 24 hours. Chest pain, shortness of breath, fever, chills, nausea, vomiting, diarrhea, change in mentation, falling, weakness, bleeding. Severe pain or pain not relieved by medications. Or, any other signs or symptoms that you may have questions about.       DISPOSITION:  x  Home With:   OT  PT  HH  RN       SNF/Inpatient Rehab/LTAC    Independent/assisted living    Hospice    Other:     CDMP Checked:   Yes x     PROBLEM LIST Updated:  Yes x       Signed:   Cuca Fuentes MD  1/14/2018  1:57 PM

## 2018-01-16 LAB
AEROBIC ID BY MALDI, ORJ11T: NORMAL
SOURCE, RSRC62: NORMAL

## 2018-01-17 ENCOUNTER — TELEPHONE (OUTPATIENT)
Dept: SURGERY | Age: 73
End: 2018-01-17

## 2018-01-17 NOTE — TELEPHONE ENCOUNTER
Please call wife Sukh Gist back regarding pts hiccups that hes had for 3 days straight now. Wife has questions.

## 2018-01-17 NOTE — TELEPHONE ENCOUNTER
Spoke with Adrian Meraz. Advised her that this issue is not something that we deal with (also asked Dr. Keila Dozier first). She will need to call the patient's PCP. Casi verbalized understanding.

## 2018-01-19 DIAGNOSIS — J90 RECURRENT LEFT PLEURAL EFFUSION: Primary | ICD-10-CM

## 2018-01-21 LAB
BACTERIA ISLT: ABNORMAL
ORGANISM ID BY MALDI, ORJ1T: NORMAL
OTHER ANTIBIOTIC SUSC ISLT: NORMAL
SOURCE, RSRC67: NORMAL
SOURCE, RSRC70: ABNORMAL
SPECIMEN SOURCE: NORMAL

## 2018-01-23 ENCOUNTER — OFFICE VISIT (OUTPATIENT)
Dept: SURGERY | Age: 73
End: 2018-01-23

## 2018-01-23 ENCOUNTER — HOSPITAL ENCOUNTER (OUTPATIENT)
Dept: GENERAL RADIOLOGY | Age: 73
Discharge: HOME | End: 2018-01-23
Payer: MEDICARE

## 2018-01-23 VITALS
OXYGEN SATURATION: 98 % | SYSTOLIC BLOOD PRESSURE: 137 MMHG | DIASTOLIC BLOOD PRESSURE: 90 MMHG | WEIGHT: 139.4 LBS | BODY MASS INDEX: 21.13 KG/M2 | RESPIRATION RATE: 18 BRPM | HEART RATE: 89 BPM | HEIGHT: 68 IN

## 2018-01-23 DIAGNOSIS — J90 PLEURAL EFFUSION, LEFT: Primary | ICD-10-CM

## 2018-01-23 DIAGNOSIS — J90 RECURRENT LEFT PLEURAL EFFUSION: ICD-10-CM

## 2018-01-23 PROCEDURE — 71046 X-RAY EXAM CHEST 2 VIEWS: CPT

## 2018-01-23 RX ORDER — COLCHICINE 0.6 MG/1
0.6 TABLET ORAL DAILY
COMMUNITY

## 2018-01-23 NOTE — PROGRESS NOTES
Follow up for left VATS. 1. Have you been to the ER, urgent care clinic since your last visit? Hospitalized since your last visit? No    2. Have you seen or consulted any other health care providers outside of the [address] [place] [address] since your last visit? Include any pap smears or colon screening.  No

## 2018-01-23 NOTE — MR AVS SNAPSHOT
[address],  T[place] Clements, [address] 
[phone] Patient: Carlton Garvin MRN: [id number] JRT:3/36/[birth year]11 Visit Information Date & Time Provider Department Dept. Phone Encounter #  
 1/23/2018  9:15 AM Ambar Martinez  [address] [facility] [phone] [id number] Follow-up Instructions Return in about 3 weeks (around 2/13/2018). Upcoming Health Maintenance Date Due Hepatitis C Screening 1945 DTaP/Tdap/Td series (1 - Tdap) 1/11/1966 FOBT Q 1 YEAR AGE 50-75 1/11/1995 ZOSTER VACCINE AGE 60> 11/11/2004 GLAUCOMA SCREENING Q2Y 1/11/2010 Pneumococcal 65+ Low/Medium Risk (1 of 2 - PCV13) 1/11/2010 MEDICARE YEARLY EXAM 1/11/2010 Influenza Age 5 to Adult 8/1/2017 Allergies as of 1/23/2018  Review Complete On: 1/23/2018 By: Ambar Martinez NP No Known Allergies Current Immunizations  Never Reviewed No immunizations on file. Not reviewed this visit You Were Diagnosed With   
  
 Codes Comments Pleural effusion, left    -  Primary ICD-10-CM: J90 ICD-9-CM: 511.9 Vitals BP Pulse Resp Height(growth percentile) Weight(growth percentile) SpO2  
 137/90 (BP 1 Location: Left arm, BP Patient Position: Sitting) 89 18 5' 8\" (1.727 m) 139 lb 6.4 oz (63.2 kg) 98% BMI Smoking Status 21.2 kg/m2 Former Smoker BMI and BSA Data Body Mass Index Body Surface Area  
 21.2 kg/m 2 1.74 m 2 Your Updated Medication List  
  
   
This list is accurate as of: 1/23/18 10:23 AM.  Always use your most recent med list.  
  
  
  
  
 amoxicillin-clavulanate 875-125 mg per tablet Commonly known as:  AUGMENTIN Take 1 Tab by mouth every twelve (12) hours. carvedilol 25 mg tablet Commonly known as:  Kip Evener Take 25 mg by mouth two (2) times daily (with meals). colchicine 0.6 mg tablet Take 0.6 mg by mouth daily. digoxin 0.25 mg tablet Commonly known as:  Janice Lucks Take 0.5 Tabs by mouth daily. L. acidoph & paracasei- S therm- Bifido 8 billion cell Cap cap Commonly known as:  SALLIE-Q/RISAQUAD Take 1 Cap by mouth daily. oxyCODONE-acetaminophen 5-325 mg per tablet Commonly known as:  PERCOCET Take 2 Tabs by mouth every four (4) hours as needed. Max Daily Amount: 12 Tabs. pantoprazole 40 mg tablet Commonly known as:  PROTONIX Take 1 Tab by mouth Before breakfast and dinner. Follow-up Instructions Return in about 3 weeks (around 2/13/2018). To-Do List   
 02/14/2018 Imaging:  XR CHEST PA LAT Patient Instructions Complete the Augmentin as prescribed Continue to use your Incentive Spiromentry 6-8 times daily Continue to use the Albuterol treatments Introducing [facility] & [facility] SERVICES! [employer] introduces [employer] patient portal. Now you can access parts of your medical record, email your doctor's office, and request medication refills online. 1. In your internet browser, go to [email or web address] 2. Click on the First Time User? Click Here link in the Sign In box. You will see the New Member Sign Up page. 3. Enter your [employer] Access Code exactly as it appears below. You will not need to use this code after youve completed the sign-up process. If you do not sign up before the expiration date, you must request a new code. · [employer] Access Code: [email or web address] Expires: 4/7/2018 10:17 PM 
 
4. Enter the last four digits of your Social Security Number (xxxx) and Date of Birth (mm/dd/yyyy) as indicated and click Submit. You will be taken to the next sign-up page. 5. Create a Fl[employer]x ID. This will be your Fl[employer] login ID and cannot be changed, so think of one that is secure and easy to remember. 6. Create a Master [employer] password. You can change your password at any time. 7. Enter your Password Reset Question and Answer. This can be used at a later time if you forget your password. 8. Enter your e-mail address. You will receive e-mail notification when new information is available in [address] 9. Click Sign Up. You can now view and download portions of your medical record. 10. Click the Download Summary menu link to download a portable copy of your medical information. If you have questions, please visit the Frequently Asked Questions section of the [employer] website. Remember, [employer] is NOT to be used for urgent needs. For medical emergencies, dial 911. Now available from your iPhone and Android! Please provide this summary of care documentation to your next provider. Your primary care clinician is listed as NONE. If you have any questions after today's visit, please call [phone].

## 2018-01-23 NOTE — PATIENT INSTRUCTIONS
Complete the Augmentin as prescribed   Continue to use your Incentive Spiromentry 6-8 times daily  Continue to use the Albuterol treatments

## 2018-01-23 NOTE — PROGRESS NOTES
Subjective:      Phil Skaggs is a 68 y.o. male presents for postop care . Procedure:  01/09/2018: Left VATS, decortication    Appetite is good. Eating a regular diet without difficulty. Bowel movements are regular. The patient is voiding without difficulty. The patient is not having any pain. .    Objective:     Visit Vitals    /90 (BP 1 Location: Left arm, BP Patient Position: Sitting)    Pulse 89    Resp 18    Ht 5' 8\" (1.727 m)    Wt 139 lb 6.4 oz (63.2 kg)    SpO2 98%    BMI 21.2 kg/m2       Physical Exam:  GENERAL: alert, cooperative, no distress, appears stated age, LUNG: clear to auscultation bilaterally, HEART: regular rate and rhythm, S1, S2 normal, no murmur, click, rub or gallop, ABDOMEN: soft, non-tender. Bowel sounds normal. No masses,  no organomegaly, EXTREMITIES:  extremities normal, atraumatic, no cyanosis or edema, SKIN: Normal.    Data Review Chest X-ray: . No pneumothorax. Again noted is pleural thickening left thorax laterally with  slight atelectasis/scarring left upper lobe. There is increasing opacity at left  base laterally which may represent increasing left effusion/atelectasis.       Assessment:     Patient Active Problem List   Diagnosis Code    Recurrent left pleural effusion J90       Doing well postoperatively. He is accomponied by his wife and  looks and feels great. Currently is not having any pain or SOB, Reports productive cough after using Albuterol treatments. Will be finishing Augmentin 875-125 mg bid started on 01/14/18          Plan/Recommendations/Medical Decision Making:   Plan to see him in 3 weeks with repeat CXR to assess residual left effusion    We discussed the importance of proper diet and 30 minutes/day of proper exercise. All questions were personally answered. Thank you for allowing us to participate in the care of your patient.     Jose Alberto Corbett, [address]  Thoracic Surgery

## 2018-01-29 ENCOUNTER — TELEPHONE (OUTPATIENT)
Dept: SURGERY | Age: 73
End: 2018-01-29

## 2018-01-29 NOTE — TELEPHONE ENCOUNTER
Spoke with patient who stated that he finished th Augmentin last week. He currently does not have a fever. The only time he coughs anything up is when he uses his inhaler, and the phlegm is yellow. I advised the patient, per Ant Acevedo NP, to call us back Thursday if he has a fever and if he is coughing up phlegm that is dark yellow or green. She may give him another antibiotic. The patient verbalized understanding of all.

## 2018-02-14 ENCOUNTER — OFFICE VISIT (OUTPATIENT)
Dept: SURGERY | Age: 73
End: 2018-02-14

## 2018-02-14 ENCOUNTER — HOSPITAL ENCOUNTER (OUTPATIENT)
Dept: GENERAL RADIOLOGY | Age: 73
Discharge: HOME | End: 2018-02-14
Payer: MEDICARE

## 2018-02-14 VITALS
WEIGHT: 146.6 LBS | RESPIRATION RATE: 18 BRPM | BODY MASS INDEX: 22.22 KG/M2 | DIASTOLIC BLOOD PRESSURE: 91 MMHG | OXYGEN SATURATION: 94 % | HEIGHT: 68 IN | SYSTOLIC BLOOD PRESSURE: 171 MMHG | HEART RATE: 101 BPM

## 2018-02-14 DIAGNOSIS — J90 PLEURAL EFFUSION ON LEFT: Primary | ICD-10-CM

## 2018-02-14 DIAGNOSIS — J90 PLEURAL EFFUSION, LEFT: ICD-10-CM

## 2018-02-14 PROCEDURE — 71046 X-RAY EXAM CHEST 2 VIEWS: CPT

## 2018-02-14 NOTE — MR AVS SNAPSHOT
[address], [address] [place] [address], [address] 
[phone] Patient: Willa Martínez MRN: [id number] LDQ:9/45/7351 Visit Information Date & Time Provider Department Dept. Phone Encounter #  
 2/14/2018  2:30 PM Laurence Brenner  [address] [facility] [phone] [id number] Follow-up Instructions Return if symptoms worsen or fail to improve. Follow-up and Disposition History Upcoming Health Maintenance Date Due Hepatitis C Screening 1945 DTaP/Tdap/Td series (1 - Tdap) 1/11/1966 FOBT Q 1 YEAR AGE 50-75 1/11/1995 ZOSTER VACCINE AGE 60> 11/11/2004 GLAUCOMA SCREENING Q2Y 1/11/2010 Pneumococcal 65+ Low/Medium Risk (1 of 2 - PCV13) 1/11/2010 MEDICARE YEARLY EXAM 1/11/2010 Influenza Age 5 to Adult 8/1/2017 Allergies as of 2/14/2018  Review Complete On: 2/14/2018 By: Laurence Brenner NP Severity Noted Reaction Type Reactions Shellfish Derived  02/14/2018    Swelling LEG swelling Current Immunizations  Never Reviewed No immunizations on file. Not reviewed this visit You Were Diagnosed With   
  
 Codes Comments Pleural effusion on left    -  Primary ICD-10-CM: J90 ICD-9-CM: 511.9 Vitals BP Pulse Resp Height(growth percentile) Weight(growth percentile) SpO2  
 (!) 171/91 (BP 1 Location: Left arm, BP Patient Position: Sitting) (!) 101 18 5' 8\" (1.727 m) 146 lb 9.6 oz (66.5 kg) 94% BMI Smoking Status 22.29 kg/m2 Former Smoker BMI and BSA Data Body Mass Index Body Surface Area  
 22.29 kg/m 2 1.79 m 2 Your Updated Medication List  
  
   
This list is accurate as of: 2/14/18 11:59 PM.  Always use your most recent med list.  
  
  
  
  
 amoxicillin-clavulanate 875-125 mg per tablet Commonly known as:  AUGMENTIN Take 1 Tab by mouth every twelve (12) hours. carvedilol 25 mg tablet Commonly known as:  Kristian Si Take 25 mg by mouth two (2) times daily (with meals). colchicine 0.6 mg tablet Take 0.6 mg by mouth daily. digoxin 0.25 mg tablet Commonly known as:  Henreitta Arlington Take 0.5 Tabs by mouth daily. L. acidoph & paracasei- S therm- Bifido 8 billion cell Cap cap Commonly known as:  SALLIE-Q/RISAQUAD Take 1 Cap by mouth daily. oxyCODONE-acetaminophen 5-325 mg per tablet Commonly known as:  PERCOCET Take 2 Tabs by mouth every four (4) hours as needed. Max Daily Amount: 12 Tabs. pantoprazole 40 mg tablet Commonly known as:  PROTONIX Take 1 Tab by mouth Before breakfast and dinner. Follow-up Instructions Return if symptoms worsen or fail to improve. Introducing [facility] & HEALTH SERVICES! [facility] introduces [employer] patient portal. Now you can access parts of your medical record, email your doctor's office, and request medication refills online. 1. In your internet browser, go to [email or web address] 2. Click on the First Time User? Click Here link in the Sign In box. You will see the New Member Sign Up page. 3. Enter your [email or web address] Access Code exactly as it appears below. You will not need to use this code after youve completed the sign-up process. If you do not sign up before the expiration date, you must request a new code. · [email or web address] Access Code: [email or web address] Expires: 4/7/2018 10:17 PM 
 
4. Enter the last four digits of your Social Security Number (xxxx) and Date of Birth (mm/dd/yyyy) as indicated and click Submit. You will be taken to the next sign-up page. 5. Create a [employer] ID. This will be your [employer] login ID and cannot be changed, so think of one that is secure and easy to remember. 6. Create a [employer] password. You can change your password at any time. 7. Enter your Password Reset Question and Answer. This can be used at a later time if you forget your password. 8. Enter your e-mail address. You will receive e-mail notification when new information is available in [address] 9. Click Sign Up. You can now view and download portions of your medical record. 10. Click the Download Summary menu link to download a portable copy of your medical information. If you have questions, please visit the Frequently Asked Questions section of the [employer] website. Remember, [employer] is NOT to be used for urgent needs. For medical emergencies, dial 911. Now available from your iPhone and Android! Please provide this summary of care documentation to your next provider. Your primary care clinician is listed as NONE. If you have any questions after today's visit, please call [phone].

## 2018-02-14 NOTE — PROGRESS NOTES
Follow up for left VATS on 1/9/18. 1. Have you been to the ER, urgent care clinic since your last visit? Hospitalized since your last visit? YES - [facility] ER a week ago for allergic reaction to shrimp    2. Have you seen or consulted any other health care providers outside of the [address] [place] [address] since your last visit? Include any pap smears or colon screening.  No

## 2018-02-16 ENCOUNTER — TELEPHONE (OUTPATIENT)
Dept: SURGERY | Age: 73
End: 2018-02-16

## 2018-02-19 ENCOUNTER — TELEPHONE (OUTPATIENT)
Dept: SURGERY | Age: 73
End: 2018-02-19

## 2018-02-19 NOTE — TELEPHONE ENCOUNTER
Spoke with Shaggy Gannon, who stated that she wants a copy of the AVS (\"just drop it in the mail\"). I advised her that it's all ready to go for her. The patient verbalized understanding.

## 2018-10-02 NOTE — PROGRESS NOTES
[address]  [address] [facility], [address]       GI PROGRESS NOTE        NAME: Joyce Mccarty   :  1945   MRN:  [id number]       Subjective:   Patient feeling well, denies signs of bleeding. No bowel movement, tolerating diet. Objective:     VITALS:   Last 24hrs VS reviewed since prior progress note. Most recent are:  Visit Vitals    BP 98/66    Pulse (!) 132    Temp 97.4 °F (36.3 °C)    Resp (!) 31    Ht 5' 8.11\" (1.73 m)    Wt 69.7 kg (153 lb 9.6 oz)    SpO2 97%    BMI 23.28 kg/m2       PHYSICAL EXAM:  General: Cooperative, no acute distress    HEENT: EOMI, no scleral icterus   Lungs:  CTA bilaterally. No wheezing  Heart:  S1 S2, tachycardic; irregular rhythm, no murmur   Abdomen: Soft, non-distended, no tenderness. +Bowel sounds  Extremities: No edema  Psych:   Good insight. Not anxious or agitated. Lab Data Reviewed:     Recent Results (from the past 24 hour(s))   VANCOMYCIN, RANDOM    Collection Time: 01/10/18  4:56 PM   Result Value Ref Range    Vancomycin, random 15.5 UG/ML   CBC WITH AUTOMATED DIFF    Collection Time: 18  2:41 AM   Result Value Ref Range    WBC 15.2 (H) 4.1 - 11.1 K/uL    RBC 3.30 (L) 4.10 - 5.70 M/uL    HGB 8.8 (L) 12.1 - 17.0 g/dL    HCT 28.3 (L) 36.6 - 50.3 %    MCV 85.8 80.0 - 99.0 FL    MCH 26.7 26.0 - 34.0 PG    MCHC 31.1 30.0 - 36.5 g/dL    RDW 18.8 (H) 11.5 - 14.5 %    PLATELET 122 (H) 356 - 400 K/uL    NEUTROPHILS 80 (H) 32 - 75 %    LYMPHOCYTES 10 (L) 12 - 49 %    MONOCYTES 10 5 - 13 %    EOSINOPHILS 0 0 - 7 %    BASOPHILS 0 0 - 1 %    ABS. NEUTROPHILS 12.1 (H) 1.8 - 8.0 K/UL    ABS. LYMPHOCYTES 1.5 0.8 - 3.5 K/UL    ABS. MONOCYTES 1.5 (H) 0.0 - 1.0 K/UL    ABS. EOSINOPHILS 0.0 0.0 - 0.4 K/UL    ABS.  BASOPHILS 0.0 0.0 - 0.1 K/UL   MAGNESIUM    Collection Time: 18  2:41 AM   Result Value Ref Range    Magnesium 1.9 1.6 - 2.4 mg/dL   METABOLIC PANEL, COMPREHENSIVE    Collection Time: 18  2:41 AM   Result Value Ref Range    Sodium 140 136 - 145 mmol/L    Potassium 4.4 3.5 - 5.1 mmol/L    Chloride 109 (H) 97 - 108 mmol/L    CO2 22 21 - 32 mmol/L    Anion gap 9 5 - 15 mmol/L    Glucose 106 (H) 65 - 100 mg/dL    BUN 36 (H) 6 - 20 MG/DL    Creatinine 2.42 (H) 0.70 - 1.30 MG/DL    BUN/Creatinine ratio 15 12 - 20      GFR est AA 32 (L) >60 ml/min/1.73m2    GFR est non-AA 26 (L) >60 ml/min/1.73m2    Calcium 8.2 (L) 8.5 - 10.1 MG/DL    Bilirubin, total 0.6 0.2 - 1.0 MG/DL    ALT (SGPT) 16 12 - 78 U/L    AST (SGOT) 8 (L) 15 - 37 U/L    Alk. phosphatase 150 (H) 45 - 117 U/L    Protein, total 6.6 6.4 - 8.2 g/dL    Albumin 1.9 (L) 3.5 - 5.0 g/dL    Globulin 4.7 (H) 2.0 - 4.0 g/dL    A-G Ratio 0.4 (L) 1.1 - 2.2     PHOSPHORUS    Collection Time: 01/11/18  2:41 AM   Result Value Ref Range    Phosphorus 4.0 2.6 - 4.7 MG/DL   EKG, 12 LEAD, INITIAL    Collection Time: 01/11/18  6:57 AM   Result Value Ref Range    Ventricular Rate 129 BPM    Atrial Rate 127 BPM    QRS Duration 82 ms    Q-T Interval 262 ms    QTC Calculation (Bezet) 383 ms    Calculated R Axis -35 degrees    Calculated T Axis -12 degrees    Diagnosis       Atrial fibrillation with rapid ventricular response  Left axis deviation  Low voltage QRS  No previous ECGs available  Confirmed by Nelly Lu M.D., Evelene Bowie ([id number]) on 1/11/2018 10:41:34 AM     DIGOXIN    Collection Time: 01/11/18  9:30 AM   Result Value Ref Range    Digoxin level 0.7 (L) 0.9 - 2.0 NG/ML            Assessment:   · Anemia: hemoglobin stable status post transfusion; no signs of GI bleeding  · Pleural Effusion/empyema: status post VATS     Patient Active Problem List   Diagnosis Code    Recurrent left pleural effusion J90     Plan:   · On PPI  · Monitor CBC, transfuse as necessary  · No plans for urgent EGD at this time, unless signs of GI bleeding  · Will sign off. Please call with any questions. Pt should follow-up with his outpt GI who performed his endoscopic procedure.      Signed By: Allegra Escamilla MD 1/11/2018  12:53 PM 107
